# Patient Record
Sex: FEMALE | Race: WHITE | NOT HISPANIC OR LATINO | Employment: FULL TIME | URBAN - METROPOLITAN AREA
[De-identification: names, ages, dates, MRNs, and addresses within clinical notes are randomized per-mention and may not be internally consistent; named-entity substitution may affect disease eponyms.]

---

## 2022-08-15 ENCOUNTER — OFFICE VISIT (OUTPATIENT)
Dept: FAMILY MEDICINE CLINIC | Facility: CLINIC | Age: 32
End: 2022-08-15

## 2022-08-15 VITALS
DIASTOLIC BLOOD PRESSURE: 82 MMHG | TEMPERATURE: 99.6 F | HEART RATE: 107 BPM | WEIGHT: 286 LBS | HEIGHT: 69 IN | BODY MASS INDEX: 42.36 KG/M2 | OXYGEN SATURATION: 98 % | SYSTOLIC BLOOD PRESSURE: 138 MMHG

## 2022-08-15 DIAGNOSIS — E28.2 PCOS (POLYCYSTIC OVARIAN SYNDROME): ICD-10-CM

## 2022-08-15 DIAGNOSIS — E11.65 TYPE 2 DIABETES MELLITUS WITH HYPERGLYCEMIA, WITHOUT LONG-TERM CURRENT USE OF INSULIN: ICD-10-CM

## 2022-08-15 DIAGNOSIS — Z76.89 ESTABLISHING CARE WITH NEW DOCTOR, ENCOUNTER FOR: Primary | ICD-10-CM

## 2022-08-15 DIAGNOSIS — R51.9 ACUTE INTRACTABLE HEADACHE, UNSPECIFIED HEADACHE TYPE: ICD-10-CM

## 2022-08-15 DIAGNOSIS — R53.83 FATIGUE, UNSPECIFIED TYPE: ICD-10-CM

## 2022-08-15 DIAGNOSIS — L65.9 HAIR LOSS: ICD-10-CM

## 2022-08-15 DIAGNOSIS — E04.1 THYROID NODULE: ICD-10-CM

## 2022-08-15 DIAGNOSIS — R23.3 EASY BRUISING: ICD-10-CM

## 2022-08-15 PROCEDURE — 99204 OFFICE O/P NEW MOD 45 MIN: CPT | Performed by: NURSE PRACTITIONER

## 2022-08-15 RX ORDER — TIRZEPATIDE 10 MG/.5ML
INJECTION, SOLUTION SUBCUTANEOUS
COMMUNITY
End: 2022-08-16

## 2022-08-15 RX ORDER — METFORMIN HYDROCHLORIDE 500 MG/1
1000 TABLET, EXTENDED RELEASE ORAL 2 TIMES DAILY
Qty: 90 TABLET | Refills: 0 | Status: SHIPPED | OUTPATIENT
Start: 2022-08-15 | End: 2022-08-17 | Stop reason: SDUPTHER

## 2022-08-15 RX ORDER — SERTRALINE HYDROCHLORIDE 100 MG/1
100 TABLET, FILM COATED ORAL DAILY
COMMUNITY
End: 2022-08-15 | Stop reason: SDUPTHER

## 2022-08-15 RX ORDER — SERTRALINE HYDROCHLORIDE 100 MG/1
100 TABLET, FILM COATED ORAL DAILY
Qty: 90 TABLET | Refills: 1 | Status: SHIPPED | OUTPATIENT
Start: 2022-08-15 | End: 2022-08-17 | Stop reason: SDUPTHER

## 2022-08-15 RX ORDER — METFORMIN HYDROCHLORIDE 500 MG/1
1000 TABLET, EXTENDED RELEASE ORAL 2 TIMES DAILY
COMMUNITY
End: 2022-08-15 | Stop reason: SDUPTHER

## 2022-08-15 NOTE — PROGRESS NOTES
"Chief Complaint  Diabetes, Thyroid Problem, and Sore Throat    Subjective          Liz Harper presents to [facility]      History of Present Illness    Liz is a 32-year-old female patient who presents today to [facility] care.  She recently moved here from [place].  She was following PCP and endocrinology, which we have requested records for.    Patient has a past medical history of depression, diabetes type 2, menstrual problems.  Her last Pap was April 2022.  Femur surgery in 2009, wisdom extraction 2022, and appendectomy 2022.  She is , she does not smoke, she does not drink.  She works at a bank.  Parents are both living.  She has 1 brother.  Family history includes diabetes, lung disease, kidney disease, mental health issues.     She does present today with several complaints.     Back spasms - She has debilitating back spasm which cause her to sweat and makes her unable to move. Cramps occur in lower back and radiates through lower pelvis.  usually 5 or 6 days after menstrual cycle.    Swollen gland and neck with a sore throat.  She presents with imaging results of CT of neck and soft tissue with contrast.  Impression shows mild multinodular goiter largest nodule 7 mm in transverse diameter. 2.  Mild cervical adenopathy.  The only node measuring more than 1 cm short axis diameter is a right level 2 node.  We will order nuclear thyroid scan for further evaluation.    Patient reports that she been with a swollen face and jaw bilaterally. She went to a dentist who dx her with \"geographic tongue\". She was informed her issues were not dental and to speak with PCP. She tells me she has painful mouth, redness of facial skin that is hot to touch. She tells me the pain is dull, deep, and aching in the right side of face down into her ears and upper neck. It does go to the left side of her face. It has been present for last three months. Both sides never hurt " "at the same time.     Fatigue all the time - has been going on for the last three months of so.     Feels like she does not sleep - She goes to sleep at 9 pm and will awake at 7 am. She feels like she has only gotten one hour of sleep. She has been tested for sleep apnea and was negative according to patient. She has trouble getting up and going, doing chores, or even getting to work. She was tested for sleep apnea by PCP in last three months.     Headaches - She has been getting them a lot lately. She tells me she took ibuprofen which takes the edge off but doesn't get rid of the headache. Driving makes headaches worse. No N/V or photosensitivity. She does report she had migraines as a child however they have subsided years ago.     Easy bruising - Has noticed some bruises     Feet swelling - Reports this has been going on for last three or four weeks. She has no swelling today in office. It was only at the end of the day however, now her feet are swollen all day even with elevation. No numbness, tingly, cramps in her legs.     Hair loss - She has noticed large clumps at the bottom of the shower and then when she brushes, more hair falls out. She is \"always hot\"    She is having trouble conceiving - has tried for 11 years. She is wondering if her back pain is related to the PCOS.     Diabetes/PCOS - She is out of her freestyle 2. She was on Ozempic before and liked it better. She does not like the Mounjaro. It is not lowering her glucose levels. She tells me it has been running 240 with the med switch. When she was on Ozempic glucose was controlled at 120 range. We will obtain labs and get her switched back to Ozempic.     Objective     Vital Signs:   /82 (BP Location: Left arm, Patient Position: Sitting, Cuff Size: Large Adult)   Pulse 107   Temp 99.6 °F (37.6 °C) (Oral)   Ht 175.3 cm (69\")   Wt 130 kg (286 lb)   SpO2 98%   BMI 42.23 kg/m²       Physical Exam  Vitals reviewed.   Constitutional:       " Appearance: She is well-developed. She is obese.      Comments: Wearing a face mask     HENT:      Head: Normocephalic and atraumatic.      Right Ear: Tympanic membrane, ear canal and external ear normal. There is no impacted cerumen.      Left Ear: Tympanic membrane, ear canal and external ear normal. There is no impacted cerumen.      Nose: Nose normal.      Mouth/Throat:      Lips: Pink. No lesions.      Mouth: Mucous membranes are moist.      Tongue: No lesions. Tongue does not deviate from midline.      Palate: Lesions present. No mass.      Pharynx: Oropharynx is clear. Uvula midline. No oropharyngeal exudate or posterior oropharyngeal erythema.      Tonsils: No tonsillar exudate.   Eyes:      Conjunctiva/sclera: Conjunctivae normal.   Neck:      Vascular: No carotid bruit.   Cardiovascular:      Rate and Rhythm: Normal rate and regular rhythm.      Pulses: Normal pulses.      Heart sounds: Normal heart sounds. No murmur heard.  Pulmonary:      Effort: Pulmonary effort is normal. No respiratory distress.      Breath sounds: Normal breath sounds.   Abdominal:      General: Bowel sounds are normal. There is no distension.      Palpations: Abdomen is soft.      Tenderness: There is no abdominal tenderness.   Musculoskeletal:         General: Normal range of motion.      Cervical back: Normal range of motion and neck supple. No rigidity or tenderness.   Lymphadenopathy:      Cervical: No cervical adenopathy.   Skin:     General: Skin is warm and dry.      Findings: No rash.      Comments: Rash along cheeks bilaterally   Neurological:      Mental Status: She is alert and oriented to person, place, and time.   Psychiatric:         Behavior: Behavior normal.                  Result Review :                                   Assessment and Plan      Diagnoses and all orders for this visit:    1. Establishing care with new doctor, encounter for (Primary)    2. Fatigue, unspecified type  Assessment & Plan:  Patient's  fatigue and additional symptoms could be related to an underlying thyroid condition.  Could also be another underlying autoimmune condition.  Going to obtain a panel of labs CBC, CMP, anemia panel, thyroid panel, vitamin panel.    Orders:  -     Comprehensive metabolic panel  -     TSH  -     T3, free  -     T4  -     Vitamin D 25 Hydroxy  -     Magnesium  -     Vitamin B12  -     Iron and TIBC  -     Ferritin  -     Folate RBC  -     CBC & Differential    3. Hair loss  -     TSH  -     T3, free  -     T4    4. Thyroid nodule  -     TSH  -     T3, free  -     T4  -     CBC & Differential    5. Easy bruising  -     Iron and TIBC  -     Ferritin  -     Folate RBC    6. Acute intractable headache, unspecified headache type  -     Vitamin D 25 Hydroxy  -     Magnesium  -     Vitamin B12    7. PCOS (polycystic ovarian syndrome)  -     Hemoglobin A1c  -     Semaglutide,0.25 or 0.5MG/DOS, (Ozempic, 0.25 or 0.5 MG/DOSE,) 2 MG/1.5ML solution pen-injector; Inject 0.25 mg under the skin into the appropriate area as directed 1 (One) Time Per Week for 28 days, THEN 0.5 mg 1 (One) Time Per Week for 42 days.  Dispense: 2 pen; Refill: 0    8. Type 2 diabetes mellitus with hyperglycemia, without long-term current use of insulin (HCC)  -     Semaglutide,0.25 or 0.5MG/DOS, (Ozempic, 0.25 or 0.5 MG/DOSE,) 2 MG/1.5ML solution pen-injector; Inject 0.25 mg under the skin into the appropriate area as directed 1 (One) Time Per Week for 28 days, THEN 0.5 mg 1 (One) Time Per Week for 42 days.  Dispense: 2 pen; Refill: 0    Other orders  -     Discontinue: metFORMIN ER (GLUCOPHAGE-XR) 500 MG 24 hr tablet; Take 2 tablets by mouth 2 (Two) Times a Day.  Dispense: 90 tablet; Refill: 0  -     Discontinue: sertraline (ZOLOFT) 100 MG tablet; Take 1 tablet by mouth Daily.  Dispense: 90 tablet; Refill: 1          Follow Up       No follow-ups on file.      Patient was given instructions and counseling regarding her condition or for health maintenance  advice. Please see specific information pulled into the AVS if appropriate.     Deidre Mehta, APRN8/18/202213:01 EDT  This note has been electronically signed

## 2022-08-16 ENCOUNTER — TELEPHONE (OUTPATIENT)
Dept: FAMILY MEDICINE CLINIC | Facility: CLINIC | Age: 32
End: 2022-08-16

## 2022-08-16 DIAGNOSIS — E04.1 THYROID NODULE: Primary | ICD-10-CM

## 2022-08-16 PROBLEM — E28.2 PCOS (POLYCYSTIC OVARIAN SYNDROME): Status: ACTIVE | Noted: 2022-08-16

## 2022-08-16 LAB
25(OH)D3+25(OH)D2 SERPL-MCNC: 12.8 NG/ML (ref 30–100)
ALBUMIN SERPL-MCNC: 4.5 G/DL (ref 3.8–4.8)
ALBUMIN/GLOB SERPL: 1.7 {RATIO} (ref 1.2–2.2)
ALP SERPL-CCNC: 95 IU/L (ref 44–121)
ALT SERPL-CCNC: 22 IU/L (ref 0–32)
AST SERPL-CCNC: 23 IU/L (ref 0–40)
BASOPHILS # BLD AUTO: 0 X10E3/UL (ref 0–0.2)
BASOPHILS NFR BLD AUTO: 1 %
BILIRUB SERPL-MCNC: 0.3 MG/DL (ref 0–1.2)
BUN SERPL-MCNC: 10 MG/DL (ref 6–20)
BUN/CREAT SERPL: 20 (ref 9–23)
CALCIUM SERPL-MCNC: 9.5 MG/DL (ref 8.7–10.2)
CHLORIDE SERPL-SCNC: 100 MMOL/L (ref 96–106)
CO2 SERPL-SCNC: 21 MMOL/L (ref 20–29)
CREAT SERPL-MCNC: 0.5 MG/DL (ref 0.57–1)
EGFRCR-CYS SERPLBLD CKD-EPI 2021: 129 ML/MIN/1.73
EOSINOPHIL # BLD AUTO: 0.2 X10E3/UL (ref 0–0.4)
EOSINOPHIL NFR BLD AUTO: 2 %
ERYTHROCYTE [DISTWIDTH] IN BLOOD BY AUTOMATED COUNT: 14 % (ref 11.7–15.4)
FERRITIN SERPL-MCNC: 75 NG/ML (ref 15–150)
FOLATE BLD-MCNC: 605 NG/ML
FOLATE RBC-MCNC: 1795 NG/ML
GLOBULIN SER CALC-MCNC: 2.6 G/DL (ref 1.5–4.5)
GLUCOSE SERPL-MCNC: 292 MG/DL (ref 65–99)
HBA1C MFR BLD: 7.1 % (ref 4.8–5.6)
HCT VFR BLD AUTO: 33.7 % (ref 34–46.6)
HGB BLD-MCNC: 11.6 G/DL (ref 11.1–15.9)
IMM GRANULOCYTES # BLD AUTO: 0 X10E3/UL (ref 0–0.1)
IMM GRANULOCYTES NFR BLD AUTO: 1 %
IRON SATN MFR SERPL: 17 % (ref 15–55)
IRON SERPL-MCNC: 55 UG/DL (ref 27–159)
LYMPHOCYTES # BLD AUTO: 1.7 X10E3/UL (ref 0.7–3.1)
LYMPHOCYTES NFR BLD AUTO: 27 %
MAGNESIUM SERPL-MCNC: 1.7 MG/DL (ref 1.6–2.3)
MCH RBC QN AUTO: 29.7 PG (ref 26.6–33)
MCHC RBC AUTO-ENTMCNC: 34.4 G/DL (ref 31.5–35.7)
MCV RBC AUTO: 86 FL (ref 79–97)
MONOCYTES # BLD AUTO: 0.3 X10E3/UL (ref 0.1–0.9)
MONOCYTES NFR BLD AUTO: 5 %
NEUTROPHILS # BLD AUTO: 4.1 X10E3/UL (ref 1.4–7)
NEUTROPHILS NFR BLD AUTO: 64 %
PLATELET # BLD AUTO: 229 X10E3/UL (ref 150–450)
POTASSIUM SERPL-SCNC: 4.2 MMOL/L (ref 3.5–5.2)
PROT SERPL-MCNC: 7.1 G/DL (ref 6–8.5)
RBC # BLD AUTO: 3.91 X10E6/UL (ref 3.77–5.28)
SODIUM SERPL-SCNC: 139 MMOL/L (ref 134–144)
T3FREE SERPL-MCNC: 3 PG/ML (ref 2–4.4)
T4 SERPL-MCNC: 9.4 UG/DL (ref 4.5–12)
TIBC SERPL-MCNC: 328 UG/DL (ref 250–450)
TSH SERPL DL<=0.005 MIU/L-ACNC: 1 UIU/ML (ref 0.45–4.5)
UIBC SERPL-MCNC: 273 UG/DL (ref 131–425)
VIT B12 SERPL-MCNC: 394 PG/ML (ref 232–1245)
WBC # BLD AUTO: 6.4 X10E3/UL (ref 3.4–10.8)

## 2022-08-16 RX ORDER — SEMAGLUTIDE 1.34 MG/ML
INJECTION, SOLUTION SUBCUTANEOUS
Qty: 2 PEN | Refills: 0 | Status: SHIPPED | OUTPATIENT
Start: 2022-08-16 | End: 2022-08-18 | Stop reason: SDUPTHER

## 2022-08-16 NOTE — TELEPHONE ENCOUNTER
Patient was in need of insulin needles yesterday at her appointment.  I was not aware of the sizing of those.  Could you check with the note that she gave us yesterday or give her a call and place the order so I can send it to pharmacy?

## 2022-08-16 NOTE — PROGRESS NOTES
Patient's labs have returned.  She is vitamin D deficient with a level of 12.8.  We need to get this above 30.  I am going to send supplementation in for this.  She will take once weekly.  This can contribute to headaches but we are still going to evaluate to see if this is what is causing the headaches.    Patient's hemoglobin A1c is 7.1.  Patient has no iron deficiency anemia noted.      Her thyroid panel came back normal however, I would like to order that thyroid scan as I discussed with her yesterday.  We will wait to see what that returns as.

## 2022-08-17 ENCOUNTER — TELEPHONE (OUTPATIENT)
Dept: FAMILY MEDICINE CLINIC | Facility: CLINIC | Age: 32
End: 2022-08-17

## 2022-08-17 NOTE — TELEPHONE ENCOUNTER
Caller: Liz Harper    Relationship: Self    Best call back number: [phone]    What test was performed: LABS    When was the test performed: 8/15/22    Where was the test performed: OFFICE    Additional notes: PATIENT REQUESTING CALL BACK TO GO OVER LABS

## 2022-08-17 NOTE — TELEPHONE ENCOUNTER
Caller: Lzi Harper    Relationship: Self    Best call back number: [phone]    Requested Prescriptions:   Requested Prescriptions     Pending Prescriptions Disp Refills   • metFORMIN ER (GLUCOPHAGE-XR) 500 MG 24 hr tablet 90 tablet 0     Sig: Take 2 tablets by mouth 2 (Two) Times a Day.   • sertraline (ZOLOFT) 100 MG tablet 90 tablet 1     Sig: Take 1 tablet by mouth Daily.        Pharmacy where request should be sent:      Additional details provided by patient: PATIENT STATED THAT SHE HAS BEEN OUT OF HER FREESTYLE FOR A FEW WEEKS BUT HUB WAS UNABLE TO FIND ON MED LISTS SO     Does the patient have less than a 3 day supply:  [x] Yes  [] No    Inez CONNER Rep   08/17/22 08:48 EDT

## 2022-08-18 DIAGNOSIS — E11.65 TYPE 2 DIABETES MELLITUS WITH HYPERGLYCEMIA, WITHOUT LONG-TERM CURRENT USE OF INSULIN: ICD-10-CM

## 2022-08-18 DIAGNOSIS — E55.9 VITAMIN D DEFICIENCY: Primary | ICD-10-CM

## 2022-08-18 DIAGNOSIS — E28.2 PCOS (POLYCYSTIC OVARIAN SYNDROME): ICD-10-CM

## 2022-08-18 RX ORDER — SERTRALINE HYDROCHLORIDE 100 MG/1
100 TABLET, FILM COATED ORAL DAILY
Qty: 90 TABLET | Refills: 1 | Status: SHIPPED | OUTPATIENT
Start: 2022-08-18 | End: 2023-01-23 | Stop reason: SDUPTHER

## 2022-08-18 RX ORDER — BLOOD-GLUCOSE METER
1 KIT MISCELLANEOUS 3 TIMES DAILY
Qty: 1 EACH | Refills: 0 | Status: SHIPPED | OUTPATIENT
Start: 2022-08-18 | End: 2023-08-18

## 2022-08-18 RX ORDER — ERGOCALCIFEROL 1.25 MG/1
50000 CAPSULE ORAL WEEKLY
Qty: 5 CAPSULE | Refills: 10 | Status: SHIPPED | OUTPATIENT
Start: 2022-08-18 | End: 2023-01-19

## 2022-08-18 RX ORDER — METFORMIN HYDROCHLORIDE 500 MG/1
1000 TABLET, EXTENDED RELEASE ORAL 2 TIMES DAILY
Qty: 90 TABLET | Refills: 0 | Status: SHIPPED | OUTPATIENT
Start: 2022-08-18 | End: 2023-01-23 | Stop reason: SDUPTHER

## 2022-08-18 RX ORDER — SEMAGLUTIDE 1.34 MG/ML
INJECTION, SOLUTION SUBCUTANEOUS
Qty: 2 PEN | Refills: 0 | Status: SHIPPED | OUTPATIENT
Start: 2022-08-18 | End: 2022-10-27

## 2022-08-18 NOTE — ASSESSMENT & PLAN NOTE
Patient's fatigue and additional symptoms could be related to an underlying thyroid condition.  Could also be another underlying autoimmune condition.  Going to obtain a panel of labs CBC, CMP, anemia panel, thyroid panel, vitamin panel.

## 2022-08-18 NOTE — PROGRESS NOTES
Not sure what happened to the Ozempic.  I did resend that to [facility] as well as a vitamin D supplement to take once weekly.  I also sent in the FreeStyle glucometer.  Thank you

## 2022-08-19 ENCOUNTER — TELEPHONE (OUTPATIENT)
Dept: FAMILY MEDICINE CLINIC | Facility: CLINIC | Age: 32
End: 2022-08-19

## 2022-08-19 NOTE — TELEPHONE ENCOUNTER
I attempted a PA for Ozempic, it declined it and gave these alternatives:     METFORMIN HCL ER TABS 500MG   METFORMIN HCL ER TABS 750MG   BYETTA INJECT/PEN 5MCG .   BYETTA INJECT/PEN 10MCG .   TRULICITY SD PEN 0.5ML 4'S 0. 0.75MG   TRULICITY SD PEN 0.5ML 4'S 1. 1.5MG   BYDUREON BCISE AUTOINJECTR 0. 2MG   TRULICITY SD PEN 0.5ML 4'S 3MG

## 2022-08-22 NOTE — TELEPHONE ENCOUNTER
Patient is already on metformin.  She has taken Ozempic before in the past and had great benefit.  Her PCP switched her to a different injectable called Mounjaro which is keeping her glusoce elevated. Will this help for the PA?

## 2022-08-29 ENCOUNTER — HOSPITAL ENCOUNTER (OUTPATIENT)
Dept: NUCLEAR MEDICINE | Facility: HOSPITAL | Age: 32
Discharge: HOME | End: 2022-08-29

## 2022-08-29 DIAGNOSIS — E04.1 THYROID NODULE: ICD-10-CM

## 2022-08-29 PROCEDURE — 0 SODIUM IODIDE 3.7 MBQ CAPSULE: Performed by: NURSE PRACTITIONER

## 2022-08-29 PROCEDURE — 78014 THYROID IMAGING W/BLOOD FLOW: CPT

## 2022-08-29 PROCEDURE — A9516 IODINE I-123 SOD IODIDE MIC: HCPCS | Performed by: NURSE PRACTITIONER

## 2022-08-29 RX ORDER — SODIUM IODIDE I 123 100 UCI/1
1 CAPSULE, GELATIN COATED ORAL
Status: COMPLETED | OUTPATIENT
Start: 2022-08-29 | End: 2022-08-29

## 2022-08-29 RX ADMIN — SODIUM IODIDE I 123 1 CAPSULE: 100 CAPSULE, GELATIN COATED ORAL at 13:31

## 2022-08-30 ENCOUNTER — HOSPITAL ENCOUNTER (OUTPATIENT)
Dept: NUCLEAR MEDICINE | Facility: HOSPITAL | Age: 32
Discharge: HOME | End: 2022-08-30

## 2022-11-23 ENCOUNTER — OFFICE VISIT (OUTPATIENT)
Dept: FAMILY MEDICINE CLINIC | Facility: CLINIC | Age: 32
End: 2022-11-23

## 2022-11-23 VITALS
OXYGEN SATURATION: 100 % | BODY MASS INDEX: 41.69 KG/M2 | HEART RATE: 98 BPM | TEMPERATURE: 98.5 F | SYSTOLIC BLOOD PRESSURE: 118 MMHG | HEIGHT: 69 IN | DIASTOLIC BLOOD PRESSURE: 84 MMHG | WEIGHT: 281.5 LBS

## 2022-11-23 DIAGNOSIS — R05.1 ACUTE COUGH: Primary | ICD-10-CM

## 2022-11-23 DIAGNOSIS — J01.00 ACUTE NON-RECURRENT MAXILLARY SINUSITIS: ICD-10-CM

## 2022-11-23 LAB
EXPIRATION DATE: NORMAL
FLUAV AG UPPER RESP QL IA.RAPID: NOT DETECTED
FLUBV AG UPPER RESP QL IA.RAPID: NOT DETECTED
INTERNAL CONTROL: NORMAL
Lab: NORMAL
SARS-COV-2 AG UPPER RESP QL IA.RAPID: NOT DETECTED

## 2022-11-23 PROCEDURE — 99213 OFFICE O/P EST LOW 20 MIN: CPT | Performed by: NURSE PRACTITIONER

## 2022-11-23 PROCEDURE — 87428 SARSCOV & INF VIR A&B AG IA: CPT | Performed by: NURSE PRACTITIONER

## 2022-11-23 RX ORDER — GUAIFENESIN AND CODEINE PHOSPHATE 100; 10 MG/5ML; MG/5ML
5 SOLUTION ORAL 4 TIMES DAILY PRN
Qty: 240 ML | Refills: 1 | Status: SHIPPED | OUTPATIENT
Start: 2022-11-23 | End: 2023-01-19

## 2022-11-23 RX ORDER — AZITHROMYCIN 250 MG/1
TABLET, FILM COATED ORAL
Qty: 6 TABLET | Refills: 0 | Status: SHIPPED | OUTPATIENT
Start: 2022-11-23 | End: 2023-01-19

## 2022-11-23 RX ORDER — PREDNISONE 5 MG/1
TABLET ORAL
Qty: 20 TABLET | Refills: 0 | Status: SHIPPED | OUTPATIENT
Start: 2022-11-23 | End: 2022-12-01

## 2022-11-23 RX ORDER — CETIRIZINE HYDROCHLORIDE 10 MG/1
10 TABLET ORAL DAILY
Qty: 30 TABLET | Refills: 2 | Status: SHIPPED | OUTPATIENT
Start: 2022-11-23 | End: 2023-01-19

## 2022-11-23 RX ORDER — TRIAMCINOLONE ACETONIDE 55 UG/1
2 SPRAY, METERED NASAL DAILY
Qty: 16.5 G | Refills: 2 | Status: SHIPPED | OUTPATIENT
Start: 2022-11-23 | End: 2023-01-19

## 2022-11-23 NOTE — PATIENT INSTRUCTIONS
COVID/influenza test  Z-Jose  Prednisone 5 mg taper dose monitor blood sugars  Codeine cough syrup 1 to 2 teaspoons every 4 hours as needed cough  Zyrtec/Nasacort/Sudafed

## 2022-11-23 NOTE — PROGRESS NOTES
"    Liz Harper is a 32 y.o. female.     History of Present Illness  32-year-old white female who comes in today with 5-day history of persistent cough.  Nasal congestion, frontal headache, shortness of breath and chills.  She was negative for COVID and influenza today and she states she has been testing daily for COVID and they have all been negative.  Lungs are clear I am placing her on a Z-Jose steroids allergy medication and a codeine cough syrup.  She got a work note to return to work Friday for half day.    Vital signs are stable                COVID/influenza test  Z-Jose  Prednisone 5 mg taper dose monitor blood sugars  Codeine cough syrup 1 to 2 teaspoons every 4 hours as needed cough  Zyrtec/Nasacort/Sudafed       The following portions of the patient's history were reviewed and updated as appropriate: allergies, current medications, past family history, past medical history, past social history, past surgical history and problem list.    Vitals:    11/23/22 1552   BP: 118/84   BP Location: Right arm   Patient Position: Sitting   Cuff Size: Large Adult   Pulse: 98   Temp: 98.5 °F (36.9 °C)   TempSrc: Oral   SpO2: 100%   Weight: 128 kg (281 lb 8 oz)   Height: 175.3 cm (69.02\")     Body mass index is 41.55 kg/m².    Past Medical History:   Diagnosis Date   • Depression    • Diabetes mellitus (HCC)    • Menstrual problem      Past Surgical History:   Procedure Laterality Date   • APPENDECTOMY     • FEMUR FRACTURE SURGERY     • WISDOM TOOTH EXTRACTION       Family History   Problem Relation Age of Onset   • No Known Problems Mother    • No Known Problems Father    • No Known Problems Brother    • Kidney disease Paternal Aunt        There is no immunization history on file for this patient.    Office Visit on 08/15/2022   Component Date Value Ref Range Status   • Glucose 08/15/2022 292 (H)  65 - 99 mg/dL Final   • BUN 08/15/2022 10  6 - 20 mg/dL Final   • Creatinine 08/15/2022 0.50 (L)  0.57 - 1.00 mg/dL " Final   • EGFR Result 08/15/2022 129  >59 mL/min/1.73 Final   • BUN/Creatinine Ratio 08/15/2022 20  9 - 23 Final   • Sodium 08/15/2022 139  134 - 144 mmol/L Final   • Potassium 08/15/2022 4.2  3.5 - 5.2 mmol/L Final   • Chloride 08/15/2022 100  96 - 106 mmol/L Final   • Total CO2 08/15/2022 21  20 - 29 mmol/L Final   • Calcium 08/15/2022 9.5  8.7 - 10.2 mg/dL Final   • Total Protein 08/15/2022 7.1  6.0 - 8.5 g/dL Final   • Albumin 08/15/2022 4.5  3.8 - 4.8 g/dL Final   • Globulin 08/15/2022 2.6  1.5 - 4.5 g/dL Final   • A/G Ratio 08/15/2022 1.7  1.2 - 2.2 Final   • Total Bilirubin 08/15/2022 0.3  0.0 - 1.2 mg/dL Final   • Alkaline Phosphatase 08/15/2022 95  44 - 121 IU/L Final   • AST (SGOT) 08/15/2022 23  0 - 40 IU/L Final   • ALT (SGPT) 08/15/2022 22  0 - 32 IU/L Final   • TSH 08/15/2022 1.000  0.450 - 4.500 uIU/mL Final   • T3, Free 08/15/2022 3.0  2.0 - 4.4 pg/mL Final   • T4, Total 08/15/2022 9.4  4.5 - 12.0 ug/dL Final   • 25 Hydroxy, Vitamin D 08/15/2022 12.8 (L)  30.0 - 100.0 ng/mL Final    Comment: Vitamin D deficiency has been defined by the [place] [employer]  [employer] and an Endocrine Society practice guideline as a  level of serum 25-OH vitamin D less than 20 ng/mL (1,2).  The Endocrine Society went on to further define vitamin D  insufficiency as a level between 21 and 29 ng/mL (2).  1. IOM ([place] [employer]). 2010. Dietary reference     intakes for calcium and D. [place]: The     National Academies Press.  2. Julio C YOUSSEF, Aylin ROSALES, Serafin ROMERO, et al.     Evaluation, treatment, and prevention of vitamin D     deficiency: an Endocrine Society clinical practice     guideline. JCEM. 2011 Jul; 96(7):1911-30.     • Magnesium 08/15/2022 1.7  1.6 - 2.3 mg/dL Final   • Vitamin B-12 08/15/2022 394  232 - 1,245 pg/mL Final   • TIBC 08/15/2022 328  250 - 450 ug/dL Final   • UIBC 08/15/2022 273  131 - 425 ug/dL Final   • Iron 08/15/2022 55  27 - 159 ug/dL Final   • Iron Saturation 08/15/2022 17  15 -  55 % Final   • Ferritin 08/15/2022 75  15 - 150 ng/mL Final   • Folate, Hemolysate 08/15/2022 605.0  Not Estab. ng/mL Final   • RBC Folate 08/15/2022 1,795  >498 ng/mL Final   • WBC 08/15/2022 6.4  3.4 - 10.8 x10E3/uL Final   • RBC 08/15/2022 3.91  3.77 - 5.28 x10E6/uL Final   • Hemoglobin 08/15/2022 11.6  11.1 - 15.9 g/dL Final   • Hematocrit 08/15/2022 33.7 (L)  34.0 - 46.6 % Final   • MCV 08/15/2022 86  79 - 97 fL Final   • MCH 08/15/2022 29.7  26.6 - 33.0 pg Final   • MCHC 08/15/2022 34.4  31.5 - 35.7 g/dL Final   • RDW 08/15/2022 14.0  11.7 - 15.4 % Final   • Platelets 08/15/2022 229  150 - 450 x10E3/uL Final   • Neutrophil Rel % 08/15/2022 64  Not Estab. % Final   • Lymphocyte Rel % 08/15/2022 27  Not Estab. % Final   • Monocyte Rel % 08/15/2022 5  Not Estab. % Final   • Eosinophil Rel % 08/15/2022 2  Not Estab. % Final   • Basophil Rel % 08/15/2022 1  Not Estab. % Final   • Neutrophils Absolute 08/15/2022 4.1  1.4 - 7.0 x10E3/uL Final   • Lymphocytes Absolute 08/15/2022 1.7  0.7 - 3.1 x10E3/uL Final   • Monocytes Absolute 08/15/2022 0.3  0.1 - 0.9 x10E3/uL Final   • Eosinophils Absolute 08/15/2022 0.2  0.0 - 0.4 x10E3/uL Final   • Basophils Absolute 08/15/2022 0.0  0.0 - 0.2 x10E3/uL Final   • Immature Granulocyte Rel % 08/15/2022 1  Not Estab. % Final   • Immature Grans Absolute 08/15/2022 0.0  0.0 - 0.1 x10E3/uL Final   • Hemoglobin A1C 08/15/2022 7.1 (H)  4.8 - 5.6 % Final    Comment:          Prediabetes: 5.7 - 6.4           Diabetes: >6.4           Glycemic control for adults with diabetes: <7.0           Review of Systems   Constitutional: Positive for fatigue.   HENT: Positive for congestion and rhinorrhea.    Respiratory: Positive for cough.    Cardiovascular: Negative.    Gastrointestinal: Negative.    Genitourinary: Negative.    Musculoskeletal: Negative.    Skin: Negative.    Neurological: Negative.    Psychiatric/Behavioral: Negative.        Objective   Physical Exam  Constitutional:        Appearance: Normal appearance.   HENT:      Head: Normocephalic.      Mouth/Throat:      Comments: Postnasal drip  Cardiovascular:      Rate and Rhythm: Normal rate and regular rhythm.      Pulses: Normal pulses.      Heart sounds: Normal heart sounds.   Pulmonary:      Effort: Pulmonary effort is normal.      Breath sounds: Normal breath sounds.   Abdominal:      General: Bowel sounds are normal.   Musculoskeletal:         General: Normal range of motion.   Skin:     General: Skin is warm and dry.   Neurological:      General: No focal deficit present.      Mental Status: She is alert and oriented to person, place, and time.   Psychiatric:         Mood and Affect: Mood normal.         Behavior: Behavior normal.         Procedures    Assessment & Plan   Diagnoses and all orders for this visit:    1. Acute cough (Primary)  -     POCT SARS-CoV-2 Antigen DAYANARA  -     guaiFENesin-codeine (GUAIFENESIN AC) 100-10 MG/5ML liquid; Take 5 mL by mouth 4 (Four) Times a Day As Needed for Cough. 1-2 tsp every 4-6 hours as needed for cough. Monitor for drowsiness  Dispense: 240 mL; Refill: 1    2. Acute non-recurrent maxillary sinusitis    Other orders  -     cetirizine (ZyrTEC Allergy) 10 MG tablet; Take 1 tablet by mouth Daily.  Dispense: 30 tablet; Refill: 2  -     Triamcinolone Acetonide (Nasacort Allergy 24HR) 55 MCG/ACT nasal inhaler; 2 sprays into the nostril(s) as directed by provider Daily.  Dispense: 16.5 g; Refill: 2  -     azithromycin (Zithromax Z-Jose) 250 MG tablet; Take 2 tablets the first day, then 1 tablet daily for 4 days.  Dispense: 6 tablet; Refill: 0  -     predniSONE 5 MG (48) tablet therapy pack dose pack; Take 4 tablets by mouth Daily for 2 days, THEN 3 tablets Daily for 2 days, THEN 2 tablets Daily for 2 days, THEN 1 tablet Daily for 2 days.  Dispense: 20 tablet; Refill: 0          Current Outpatient Medications:   •  Continuous Blood Gluc Sensor (FreeStyle Jim 2 Sensor) misc, USE AS DIRECTED., Disp: 1  each, Rfl: 0  •  glucose monitoring kit (FREESTYLE) monitoring kit, 1 each 3 (Three) Times a Day., Disp: 1 each, Rfl: 0  •  metFORMIN ER (GLUCOPHAGE-XR) 500 MG 24 hr tablet, Take 2 tablets by mouth 2 (Two) Times a Day., Disp: 90 tablet, Rfl: 0  •  Semaglutide, 1 MG/DOSE, (OZEMPIC) 2 MG/1.5ML solution pen-injector, Inject  under the skin into the appropriate area as directed 1 (One) Time Per Week. weekly, Disp: , Rfl:   •  sertraline (ZOLOFT) 100 MG tablet, Take 1 tablet by mouth Daily., Disp: 90 tablet, Rfl: 1  •  vitamin D (ERGOCALCIFEROL) 1.25 MG (90459 UT) capsule capsule, Take 1 capsule by mouth 1 (One) Time Per Week., Disp: 5 capsule, Rfl: 10  •  azithromycin (Zithromax Z-Jose) 250 MG tablet, Take 2 tablets the first day, then 1 tablet daily for 4 days., Disp: 6 tablet, Rfl: 0  •  cetirizine (ZyrTEC Allergy) 10 MG tablet, Take 1 tablet by mouth Daily., Disp: 30 tablet, Rfl: 2  •  guaiFENesin-codeine (GUAIFENESIN AC) 100-10 MG/5ML liquid, Take 5 mL by mouth 4 (Four) Times a Day As Needed for Cough. 1-2 tsp every 4-6 hours as needed for cough. Monitor for drowsiness, Disp: 240 mL, Rfl: 1  •  predniSONE 5 MG (48) tablet therapy pack dose pack, Take 4 tablets by mouth Daily for 2 days, THEN 3 tablets Daily for 2 days, THEN 2 tablets Daily for 2 days, THEN 1 tablet Daily for 2 days., Disp: 20 tablet, Rfl: 0  •  Triamcinolone Acetonide (Nasacort Allergy 24HR) 55 MCG/ACT nasal inhaler, 2 sprays into the nostril(s) as directed by provider Daily., Disp: 16.5 g, Rfl: 2           Lissa Reynaga, APRN 11/23/2022 16:09 EST  This note has been electronically signed

## 2023-01-19 ENCOUNTER — OFFICE VISIT (OUTPATIENT)
Dept: FAMILY MEDICINE CLINIC | Facility: CLINIC | Age: 33
End: 2023-01-19
Payer: COMMERCIAL

## 2023-01-19 DIAGNOSIS — E11.65 TYPE 2 DIABETES MELLITUS WITH HYPERGLYCEMIA, WITHOUT LONG-TERM CURRENT USE OF INSULIN: Primary | ICD-10-CM

## 2023-01-19 DIAGNOSIS — U07.1 POSITIVE SELF-ADMINISTERED ANTIGEN TEST FOR COVID-19: ICD-10-CM

## 2023-01-19 PROCEDURE — 99442 PR PHYS/QHP TELEPHONE EVALUATION 11-20 MIN: CPT | Performed by: NURSE PRACTITIONER

## 2023-01-19 NOTE — PROGRESS NOTES
Chief Complaint  Diabetes and Illness    Subjective          Liz Harper presents to [facility] MEDICINE     You have chosen to receive care through a telephone visit. Do you consent to use a telephone visit for your medical care today? Yes    0900 - Call Start   0915 - Call End   15 minutes- Total Call time      History of Present Illness    Liz is a 32 year old female patient who presents today via telephone encounter to discuss diabetes and positive home COVID test.  We are doing a telephone encounter as she is positive for COVID.    Tested positive yesterday for Covid with home test. Symptoms began Tuesday around 2 a.m. She is with sore throat, runny nose, cough dry, chest congestion, and intermittent nausea. She is without fevers, SOB, CP. She has not taken anything OTC.  She is requesting treatment with Paxlovid antiviral therapy.  Discussed potential side effects and unwanted symptoms.  Patient would like to proceed with medication.    Pt is diabetic. She reports her glucose levels are in the 300's fasting.  She indicates that she cannot get them under control. She is taking metformin and Ozempic. She is willing to come in next week for labs. Last A1C was August 2022 at 7.1. She is with bilateral neuropathy pain in toes and feet, which is new.  She tells me it is painful and tingly.  Only stays in the feet does not radiate above.  She denies polyuria, polydipsia, polyphagia.      Objective     Vital Signs:   There were no vitals taken for this visit.          Physical Exam     No physical exam performed today as this was a telephone encounter.  Patient was able to communicate with ease and without pause.  She denied being in any distress.      Result Review :                                   Assessment and Plan      Diagnoses and all orders for this visit:    1. Type 2 diabetes mellitus with hyperglycemia, without long-term current use of insulin (HCC)  (Primary)  Assessment & Plan:  Diabetes is worsening.   Continue current treatment regimen.  Reminded to bring in blood sugar diary at next visit.  Dietary recommendations for ADA diet.  Discussed sick day management.  Discussed foot care.  Will assess labs next week and follow back up with patient.  Sounds like she will need some medication adjustments which we will take care of.  Diabetes will be reassessed After labs have been evaluated..    Orders:  -     CBC (No Diff); Future  -     Comprehensive metabolic panel; Future  -     Hemoglobin A1c; Future  -     Lipid Panel With LDL / HDL Ratio; Future    2. Positive self-administered antigen test for COVID-19  Assessment & Plan:  Patient with positive home COVID test.  She is symptomatic and underlying condition of diabetes and obesity.    Paxlovid REQUESTED BY PATIENT for treatment of COVID infection.  I did speak with patient about this not being an FDA approved medication.  Informed patient that this medication is for mild to moderate COVID symptoms in patients who have tested positive for COVID and at risk for severe progression of COVID-19. We discussed potential side effects and symptoms of medication including, but not limited to, Hives, trouble swallowing or breathing, swelling of mouth lips or face, throat tightness, hoarseness, skin rash, liver issues altered taste, diarrhea, hypertension, muscle aches.      Orders:  -     Nirmatrelvir&Ritonavir 300/100 (PAXLOVID) 20 x 150 MG & 10 x 100MG tablet therapy pack tablet; Take 3 tablets by mouth 2 (Two) Times a Day for 5 days.  Dispense: 30 tablet; Refill: 0          Follow Up       No follow-ups on file.      Patient was given instructions and counseling regarding her condition or for health maintenance advice. Please see specific information pulled into the AVS if appropriate.     Deidre Mehta, APRN1/19/202309:19 EST  This note has been electronically signed

## 2023-01-19 NOTE — ASSESSMENT & PLAN NOTE
Diabetes is worsening.   Continue current treatment regimen.  Reminded to bring in blood sugar diary at next visit.  Dietary recommendations for ADA diet.  Discussed sick day management.  Discussed foot care.  Will assess labs next week and follow back up with patient.  Sounds like she will need some medication adjustments which we will take care of.  Diabetes will be reassessed After labs have been evaluated..

## 2023-01-19 NOTE — ASSESSMENT & PLAN NOTE
Patient with positive home COVID test.  She is symptomatic and underlying condition of diabetes and obesity.    Paxlovid REQUESTED BY PATIENT for treatment of COVID infection.  I did speak with patient about this not being an FDA approved medication.  Informed patient that this medication is for mild to moderate COVID symptoms in patients who have tested positive for COVID and at risk for severe progression of COVID-19. We discussed potential side effects and symptoms of medication including, but not limited to, Hives, trouble swallowing or breathing, swelling of mouth lips or face, throat tightness, hoarseness, skin rash, liver issues altered taste, diarrhea, hypertension, muscle aches.

## 2023-01-23 RX ORDER — SERTRALINE HYDROCHLORIDE 100 MG/1
100 TABLET, FILM COATED ORAL DAILY
Qty: 90 TABLET | Refills: 1 | Status: SHIPPED | OUTPATIENT
Start: 2023-01-23

## 2023-01-23 RX ORDER — METFORMIN HYDROCHLORIDE 500 MG/1
1000 TABLET, EXTENDED RELEASE ORAL 2 TIMES DAILY
Qty: 90 TABLET | Refills: 0 | Status: SHIPPED | OUTPATIENT
Start: 2023-01-23 | End: 2023-03-27 | Stop reason: SDUPTHER

## 2023-01-24 DIAGNOSIS — R73.09 ELEVATED HEMOGLOBIN A1C: ICD-10-CM

## 2023-01-24 DIAGNOSIS — E11.65 TYPE 2 DIABETES MELLITUS WITH HYPERGLYCEMIA, WITHOUT LONG-TERM CURRENT USE OF INSULIN: Primary | ICD-10-CM

## 2023-01-24 DIAGNOSIS — E78.2 ELEVATED TRIGLYCERIDES WITH HIGH CHOLESTEROL: Primary | ICD-10-CM

## 2023-01-24 RX ORDER — CHLORAL HYDRATE 500 MG
1000 CAPSULE ORAL
Qty: 90 EACH | Refills: 3 | Status: SHIPPED | OUTPATIENT
Start: 2023-01-24

## 2023-01-24 RX ORDER — SEMAGLUTIDE 1.34 MG/ML
INJECTION, SOLUTION SUBCUTANEOUS
Qty: 3 ML | Refills: 0 | Status: SHIPPED | OUTPATIENT
Start: 2023-01-24 | End: 2023-04-04

## 2023-03-27 RX ORDER — METFORMIN HYDROCHLORIDE 500 MG/1
1000 TABLET, EXTENDED RELEASE ORAL 2 TIMES DAILY
Qty: 90 TABLET | Refills: 0 | Status: SHIPPED | OUTPATIENT
Start: 2023-03-27

## 2023-03-27 NOTE — TELEPHONE ENCOUNTER
Caller: Liz Harper    Relationship: Self    Best call back number: [phone]    Requested Prescriptions:   Requested Prescriptions     Pending Prescriptions Disp Refills   • metFORMIN ER (GLUCOPHAGE-XR) 500 MG 24 hr tablet 90 tablet 0     Sig: Take 2 tablets by mouth 2 (Two) Times a Day.        Pharmacy where request should be sent: [facility] [address] [place], [place] [phone] [place], [place] [phone] FX     Last office visit with prescribing clinician: 1/19/2023   Last telemedicine visit with prescribing clinician: Visit date not found   Next office visit with prescribing clinician: Visit date not found     Additional details provided by patient: TOOK LAST DOSE TODAY     Does the patient have less than a 3 day supply:  [x] Yes  [] No    Would you like a call back once the refill request has been completed: [] Yes [] No    If the office needs to give you a call back, can they leave a voicemail: [] Yes [] No    Inez Anand Rep   03/27/23 09:04 EDT

## 2023-04-21 ENCOUNTER — OFFICE VISIT (OUTPATIENT)
Dept: FAMILY MEDICINE CLINIC | Facility: CLINIC | Age: 33
End: 2023-04-21
Payer: COMMERCIAL

## 2023-04-21 VITALS
TEMPERATURE: 97.2 F | BODY MASS INDEX: 40.52 KG/M2 | OXYGEN SATURATION: 97 % | SYSTOLIC BLOOD PRESSURE: 156 MMHG | WEIGHT: 273.6 LBS | HEART RATE: 104 BPM | HEIGHT: 69 IN | DIASTOLIC BLOOD PRESSURE: 96 MMHG

## 2023-04-21 DIAGNOSIS — E11.65 TYPE 2 DIABETES MELLITUS WITH HYPERGLYCEMIA, WITHOUT LONG-TERM CURRENT USE OF INSULIN: Primary | ICD-10-CM

## 2023-04-21 DIAGNOSIS — G62.9 NEUROPATHY: ICD-10-CM

## 2023-04-21 PROCEDURE — 99214 OFFICE O/P EST MOD 30 MIN: CPT | Performed by: NURSE PRACTITIONER

## 2023-04-21 RX ORDER — GABAPENTIN 100 MG/1
100 CAPSULE ORAL NIGHTLY
Qty: 30 CAPSULE | Refills: 0 | Status: SHIPPED | OUTPATIENT
Start: 2023-04-21

## 2023-04-21 NOTE — PROGRESS NOTES
"Chief Complaint  Diabetes    Subjective          Liz Harper presents to [facility] MEDIC[facility]    History of Present Illness    Liz is a 33-year-old female patient who presents today to follow-up regarding diabetes.    Patient was seen in January and advised to follow-up 6 to 8 weeks later as her diabetes was worsening.  She did not come in for labs prior to this visit.  Last A1c was 8.8 in January.  Patient is currently on metformin 1000 mg twice daily.     She was previously on Ozempic 1 mg weekly when she lived in [place]. She is tolerating the 0.5 mg. She is now out and asking for a refill. She usually injects on Tuesday and did not have a dose for this week.     She tells me since January her glucose levels have been terrible. She is with bilateral foot pain and tingling. Did not have this issue prior to January per patient. Pain in feet occurs at end of day when she is sitting down and relaxing. Sharp shooting, throbbing foot pain with numbness. When she goes to bed it keeps her awake. The pain is resolved by morning.     She works out twice weekly at [employer]. Her diet consists of vegetables, low carb, whole grain, high protein. Has lost 8 lbs since November.       Objective     Vital Signs:   /96 (BP Location: Left arm, Patient Position: Sitting, Cuff Size: Adult)   Pulse 104   Temp 97.2 °F (36.2 °C) (Oral)   Ht 175.3 cm (69.02\")   Wt 124 kg (273 lb 9.6 oz)   SpO2 97%   BMI 40.38 kg/m²           Physical Exam  Vitals reviewed.   Constitutional:       Appearance: She is well-developed.      Comments: Wearing a face mask     HENT:      Head: Normocephalic and atraumatic.      Nose: Nose normal.      Mouth/Throat:      Mouth: Mucous membranes are moist.      Pharynx: Oropharynx is clear.   Eyes:      Conjunctiva/sclera: Conjunctivae normal.      Pupils: Pupils are equal, round, and reactive to light.   Cardiovascular:      Rate and Rhythm: Normal rate and " regular rhythm.      Pulses: Normal pulses.      Heart sounds: Normal heart sounds.   Pulmonary:      Effort: Pulmonary effort is normal. No respiratory distress.      Breath sounds: Normal breath sounds.   Musculoskeletal:         General: Normal range of motion.      Cervical back: Normal range of motion.   Skin:     General: Skin is warm and dry.      Findings: No rash.   Neurological:      Mental Status: She is alert and oriented to person, place, and time.   Psychiatric:         Behavior: Behavior normal.                Result Review :                                   Assessment and Plan      Diagnoses and all orders for this visit:    1. Type 2 diabetes mellitus with hyperglycemia, without long-term current use of insulin (Primary)  -     CBC (No Diff)  -     Hemoglobin A1c  -     Semaglutide, 1 MG/DOSE, (OZEMPIC) 4 MG/3ML solution pen-injector; Inject 1 mg under the skin into the appropriate area as directed 1 (One) Time Per Week.  Dispense: 3 mL; Refill: 2  -     Continuous Blood Gluc Sensor (FreeStyle Jim 2 Sensor) misc; Apply 1 each topically to the appropriate area as directed See Admin Instructions.  Dispense: 2 each; Refill: 3  -     Ambulatory Referral to Endocrinology    2. Neuropathy  -     gabapentin (NEURONTIN) 100 MG capsule; Take 1 capsule by mouth Every Night.  Dispense: 30 capsule; Refill: 0  -     Ambulatory Referral to Endocrinology      Patient reports her glucose levels have been elevated since January.  She is working out and making dietary changes but no improvement.  She was previously on Ozempic 1 mg weekly prior to establish to establishing here at our office.  Patient also on Cycloset, metformin.  We will continue that regimen but bump her Ozempic up to 1 mg weekly dose.  Advised her to go ahead and inject this weekend.  Reeducated on side effects and symptoms that she experiences.  We will check CBC and A1c.  Going to place an endocrinology referral as well.  For patient's onset  of neuropathy going to give her evening gabapentin 100 mg.  We will monitor and follow-up with her in 4 weeks.            Follow Up       Return in about 4 weeks (around 5/19/2023) for with JOSE DE JESUS Lenz.      Patient was given instructions and counseling regarding her condition or for health maintenance advice. Please see specific information pulled into the AVS if appropriate.     Deidre Mehta, APRN4/21/202308:48 EDT  This note has been electronically signed

## 2023-04-22 LAB
ERYTHROCYTE [DISTWIDTH] IN BLOOD BY AUTOMATED COUNT: 13.9 % (ref 11.7–15.4)
HBA1C MFR BLD: 9 % (ref 4.8–5.6)
HCT VFR BLD AUTO: 40.2 % (ref 34–46.6)
HGB BLD-MCNC: 13.3 G/DL (ref 11.1–15.9)
MCH RBC QN AUTO: 28.4 PG (ref 26.6–33)
MCHC RBC AUTO-ENTMCNC: 33.1 G/DL (ref 31.5–35.7)
MCV RBC AUTO: 86 FL (ref 79–97)
PLATELET # BLD AUTO: 238 X10E3/UL (ref 150–450)
RBC # BLD AUTO: 4.69 X10E6/UL (ref 3.77–5.28)
WBC # BLD AUTO: 7.5 X10E3/UL (ref 3.4–10.8)

## 2023-04-24 ENCOUNTER — TELEPHONE (OUTPATIENT)
Dept: FAMILY MEDICINE CLINIC | Facility: CLINIC | Age: 33
End: 2023-04-24
Payer: COMMERCIAL

## 2023-04-24 NOTE — PROGRESS NOTES
Please notify Liz that I received her labs back.  Blood counts are normal.  Her A1c has slightly worsened from where it was 3 months ago.  It was 8.8 back in January is 9.0 now.  This should improve as we increased the Ozempic dosage.  I will see her in May.

## 2023-04-24 NOTE — TELEPHONE ENCOUNTER
I spoke with patient. She said the $900 is her copay AFTER insurance. I advised her to go on the ozempic website and download a form to fill out to get small copay and give to pharmacy.If this does not work, then she will need to go on a different dm medication due to cost.

## 2023-04-24 NOTE — TELEPHONE ENCOUNTER
I just looked up this medication PA and it says it is covered without authorization. I will call patient after lunch and discuss with her.

## 2023-04-24 NOTE — TELEPHONE ENCOUNTER
Was the patient getting this covered before?  If so did her insurance changed?  If the insurance changed, she needs to contact them and see what injectables are covered for her diabetes.

## 2023-04-24 NOTE — TELEPHONE ENCOUNTER
Caller: Liz Harper    Relationship: Self    Best call back number: [phone] (Mobile)    What medications are you currently taking:   Current Outpatient Medications on File Prior to Visit   Medication Sig Dispense Refill   • Bromocriptine Mesylate (CYCLOSET PO) Take 1 mg by mouth 2 (Two) Times a Day.     • Continuous Blood Gluc Sensor (FreeStyle Jim 2 Sensor) misc Apply 1 each topically to the appropriate area as directed See Admin Instructions. 2 each 3   • gabapentin (NEURONTIN) 100 MG capsule Take 1 capsule by mouth Every Night. 30 capsule 0   • glucose monitoring kit (FREESTYLE) monitoring kit 1 each 3 (Three) Times a Day. 1 each 0   • metFORMIN ER (GLUCOPHAGE-XR) 500 MG 24 hr tablet Take 2 tablets by mouth 2 (Two) Times a Day. 90 tablet 0   • Omega-3 Fatty Acids (fish oil) 1000 MG capsule capsule Take 1 capsule by mouth Daily With Breakfast. 90 each 3   • Semaglutide, 1 MG/DOSE, (OZEMPIC) 4 MG/3ML solution pen-injector Inject 1 mg under the skin into the appropriate area as directed 1 (One) Time Per Week. 3 mL 2   • sertraline (ZOLOFT) 100 MG tablet Take 1 tablet by mouth Daily. 90 tablet 1     No current facility-administered medications on file prior to visit.          When did you start taking these medications:      Which medication are you concerned about: Semaglutide, 1 MG/DOSE, (OZEMPIC) 4 MG/3ML solution pen-injector    Who prescribed you this medication: YADIEL THOMAS    What are your concerns: IT IS GOING TO COST PATIENT $900 PER MONTH    How long have you had these concerns:          THANKS

## 2023-04-24 NOTE — TELEPHONE ENCOUNTER
HUB TO READ  I left a  for patient to return call  Also, please tell her that I looked at the Prior Auth for her Ozempic and it tells me that its approved and she does not need one, so did something change, insurance or something? Will they not fill it at pharmacy?    And following msg is from Deidre:    Please notify Liz that I received her labs back.  Blood counts are normal.  Her A1c has slightly worsened from where it was 3 months ago.  It was 8.8 back in January is 9.0 now.  This should improve as we increased the Ozempic dosage.  I will see her in May.

## 2023-04-26 ENCOUNTER — TELEPHONE (OUTPATIENT)
Dept: FAMILY MEDICINE CLINIC | Facility: CLINIC | Age: 33
End: 2023-04-26
Payer: COMMERCIAL

## 2023-04-26 NOTE — TELEPHONE ENCOUNTER
Caller: Liz Harper    Relationship: Self    Best call back number:[phone]    What medication are you requesting: SUBSTITUTE FOR OZEMPIC. NOT TRULICITY     What are your current symptoms:     How long have you been experiencing symptoms:   Have you had these symptoms before:    [x] Yes  [] No    Have you been treated for these symptoms before:   [x] Yes  [] No    If a prescription is needed, what is your preferred pharmacy and phone number: [address] [phone] [place] [phone]      Additional notes: PATIENT CALLED BACK  AND THE MEDICATION OZEMPIC, WILL STILL BE  WAY TO EXPENSIVE EVEN WITH THE CARD AND THE INSURANCE.

## 2023-04-27 DIAGNOSIS — E11.65 TYPE 2 DIABETES MELLITUS WITH HYPERGLYCEMIA, WITHOUT LONG-TERM CURRENT USE OF INSULIN: Primary | ICD-10-CM

## 2023-04-27 RX ORDER — TIRZEPATIDE 2.5 MG/.5ML
2.5 INJECTION, SOLUTION SUBCUTANEOUS WEEKLY
Qty: 2 ML | Refills: 0 | Status: SHIPPED | OUTPATIENT
Start: 2023-04-27 | End: 2023-05-27

## 2023-04-27 NOTE — TELEPHONE ENCOUNTER
Please notify patient that I have a prescription savings card for a different injectable called Mounjaro.  This savings card will be upfront for her to .  I will place the order and she will give this to the pharmacy.  This is good for 1 year.  She will inject weekly just as she has been doing with with Ozempic.  This medication comes with similar side effect profile however they are very rare to occur.  She may experience some nausea.  If she develops any severe abdominal pain advised to go to ER and no longer injects.

## 2023-05-02 NOTE — TELEPHONE ENCOUNTER
I SPOKE WITH PATIENT, SHE VOICED  UNDERSTANDING. SHE PICKED THIS UP AT THE PHARMACY AND WILL FILL IT.

## 2023-05-04 ENCOUNTER — OFFICE VISIT (OUTPATIENT)
Dept: FAMILY MEDICINE CLINIC | Facility: CLINIC | Age: 33
End: 2023-05-04
Payer: COMMERCIAL

## 2023-05-04 VITALS
SYSTOLIC BLOOD PRESSURE: 151 MMHG | HEART RATE: 95 BPM | OXYGEN SATURATION: 97 % | HEIGHT: 69 IN | WEIGHT: 274 LBS | TEMPERATURE: 95.2 F | BODY MASS INDEX: 40.58 KG/M2 | DIASTOLIC BLOOD PRESSURE: 94 MMHG

## 2023-05-04 DIAGNOSIS — N30.01 ACUTE CYSTITIS WITH HEMATURIA: Primary | ICD-10-CM

## 2023-05-04 DIAGNOSIS — R10.2 PELVIC PAIN: ICD-10-CM

## 2023-05-04 DIAGNOSIS — B37.31 VAGINAL YEAST INFECTION: ICD-10-CM

## 2023-05-04 PROBLEM — R10.9 ABDOMINAL PAIN: Status: ACTIVE | Noted: 2023-05-04

## 2023-05-04 LAB
BILIRUB BLD-MCNC: NEGATIVE MG/DL
CLARITY, POC: CLEAR
COLOR UR: ABNORMAL
EXPIRATION DATE: ABNORMAL
GLUCOSE UR STRIP-MCNC: ABNORMAL MG/DL
KETONES UR QL: NEGATIVE
LEUKOCYTE EST, POC: ABNORMAL
Lab: ABNORMAL
NITRITE UR-MCNC: POSITIVE MG/ML
PH UR: 5 [PH] (ref 5–8)
PROT UR STRIP-MCNC: NEGATIVE MG/DL
RBC # UR STRIP: ABNORMAL /UL
SP GR UR: 1.02 (ref 1–1.03)
UROBILINOGEN UR QL: ABNORMAL

## 2023-05-04 PROCEDURE — 81003 URINALYSIS AUTO W/O SCOPE: CPT | Performed by: NURSE PRACTITIONER

## 2023-05-04 PROCEDURE — 99213 OFFICE O/P EST LOW 20 MIN: CPT | Performed by: NURSE PRACTITIONER

## 2023-05-04 RX ORDER — FLUCONAZOLE 200 MG/1
TABLET ORAL
COMMUNITY
Start: 2023-05-04

## 2023-05-04 RX ORDER — CIPROFLOXACIN 500 MG/1
500 TABLET, FILM COATED ORAL 2 TIMES DAILY
Qty: 10 TABLET | Refills: 0 | Status: SHIPPED | OUTPATIENT
Start: 2023-05-04

## 2023-05-04 NOTE — PROGRESS NOTES
"Chief Complaint  Abdominal Pain    Subjective          Liz Harper presents to [facility]IC[facility]      History of Present Illness    Liz is a 33-year-old female patient who presents today with complaints of pelvic pain.    Patient reports a week ago she developed symptoms of dysuria, itching, and irritation.  She did see a provider who sent in Diflucan for her earlier today.  Patient has not picked up.  She wanted further evaluation due to the pain and dysuria.  UA reveals positive nitrates, small leukocytes, trace blood.  Glucose urea greater than thousand.  Patient is diabetic.        Objective     Vital Signs:   /94 (BP Location: Left arm, Patient Position: Sitting, Cuff Size: Adult)   Pulse 95   Temp 95.2 °F (35.1 °C) (Infrared)   Ht 175.3 cm (69.02\")   Wt 124 kg (274 lb)   SpO2 97%   BMI 40.44 kg/m²           Physical Exam  Vitals reviewed.   Constitutional:       Appearance: She is well-developed.      Comments: Wearing a face mask     HENT:      Head: Normocephalic and atraumatic.      Mouth/Throat:      Mouth: Mucous membranes are moist.      Pharynx: Oropharynx is clear.   Eyes:      Conjunctiva/sclera: Conjunctivae normal.      Pupils: Pupils are equal, round, and reactive to light.   Cardiovascular:      Rate and Rhythm: Normal rate and regular rhythm.      Pulses: Normal pulses.      Heart sounds: Normal heart sounds.   Pulmonary:      Effort: Pulmonary effort is normal. No respiratory distress.      Breath sounds: Normal breath sounds.   Abdominal:      Palpations: Abdomen is soft.      Tenderness: There is abdominal tenderness in the suprapubic area.       Musculoskeletal:         General: Normal range of motion.      Cervical back: Normal range of motion.   Skin:     General: Skin is warm and dry.      Findings: No rash.   Neurological:      Mental Status: She is alert and oriented to person, place, and time.   Psychiatric:         Behavior: " Behavior normal.                Result Review :                                   Assessment and Plan      Diagnoses and all orders for this visit:    1. Acute cystitis with hematuria (Primary)    2. Pelvic pain  -     POC Urinalysis Dipstick, Automated    3. Vaginal yeast infection    Other orders  -     ciprofloxacin (Cipro) 500 MG tablet; Take 1 tablet by mouth 2 (Two) Times a Day.  Dispense: 10 tablet; Refill: 0      Going to treat patient with 5 days of Cipro twice daily.  Advised to increase water intake.  Instructed to  Diflucan from pharmacy when she picks up Cipro.  We will culture.  Will notify patient when those results return.            Follow Up       No follow-ups on file.      Patient was given instructions and counseling regarding her condition or for health maintenance advice. Please see specific information pulled into the AVS if appropriate.     Deidre Mehta, APRN5/4/202311:23 EDT  This note has been electronically signed

## 2023-05-06 LAB
BACTERIA UR CULT: NORMAL
BACTERIA UR CULT: NORMAL

## 2023-05-22 ENCOUNTER — OFFICE VISIT (OUTPATIENT)
Dept: FAMILY MEDICINE CLINIC | Facility: CLINIC | Age: 33
End: 2023-05-22
Payer: COMMERCIAL

## 2023-05-22 VITALS
DIASTOLIC BLOOD PRESSURE: 93 MMHG | HEART RATE: 97 BPM | SYSTOLIC BLOOD PRESSURE: 146 MMHG | HEIGHT: 69 IN | OXYGEN SATURATION: 97 % | TEMPERATURE: 96.8 F | BODY MASS INDEX: 25.62 KG/M2 | WEIGHT: 173 LBS

## 2023-05-22 DIAGNOSIS — G62.9 NEUROPATHY: ICD-10-CM

## 2023-05-22 DIAGNOSIS — R03.0 ELEVATED BP WITHOUT DIAGNOSIS OF HYPERTENSION: ICD-10-CM

## 2023-05-22 DIAGNOSIS — E11.40 TYPE 2 DIABETES MELLITUS WITH DIABETIC NEUROPATHY, WITHOUT LONG-TERM CURRENT USE OF INSULIN: ICD-10-CM

## 2023-05-22 DIAGNOSIS — E11.65 TYPE 2 DIABETES MELLITUS WITH HYPERGLYCEMIA, WITHOUT LONG-TERM CURRENT USE OF INSULIN: Primary | ICD-10-CM

## 2023-05-22 PROCEDURE — 99213 OFFICE O/P EST LOW 20 MIN: CPT | Performed by: NURSE PRACTITIONER

## 2023-05-22 RX ORDER — TIRZEPATIDE 5 MG/.5ML
5 INJECTION, SOLUTION SUBCUTANEOUS WEEKLY
Qty: 3 ML | Refills: 0 | Status: SHIPPED | OUTPATIENT
Start: 2023-05-22

## 2023-05-22 RX ORDER — METFORMIN HYDROCHLORIDE 500 MG/1
1000 TABLET, EXTENDED RELEASE ORAL 2 TIMES DAILY
Qty: 90 TABLET | Refills: 3 | Status: SHIPPED | OUTPATIENT
Start: 2023-05-22

## 2023-05-22 RX ORDER — GABAPENTIN 100 MG/1
100 CAPSULE ORAL NIGHTLY
Qty: 90 CAPSULE | Refills: 3 | Status: SHIPPED | OUTPATIENT
Start: 2023-05-22

## 2023-05-22 NOTE — PROGRESS NOTES
"Chief Complaint  Med Refill    Subjective          Liz Harper presents to [facility] MEDICINE      History of Present Illness    Mary ramirez 33-year-old female patient who presents today to follow-up on diabetes.    Last A1c was 9.0 4/21/2023.  Prior to that it was 8.8 in January.  She is currently taking metformin 1000 mg twice daily, has a freestyle jerald 2 sensor.  She is in need of refills of both of these today.    We did get her approved for the Mounjaro back in April. We started her on 2.5 mg weekly. She will inject 4th treatment this week. Tolerating well but does admit to some mild side effects the day after of abdominal cramps generalized. Occur off and on after a meal.  Not consistent, cramps and isolated in 1 area.    BP slightly elevated at 146/93.  But has not gone to sleep as she works night shift. Will have monitor at home and follow up at next visit.  If remains elevated at next visit we will likely start antihypertensive      Objective     Vital Signs:   /93 (BP Location: Left arm, Patient Position: Sitting, Cuff Size: Adult)   Pulse 97   Temp 96.8 °F (36 °C) (Infrared)   Ht 175.3 cm (69.02\")   Wt 78.5 kg (173 lb)   SpO2 97%   BMI 25.54 kg/m²           Physical Exam  Vitals reviewed.   Constitutional:       Appearance: She is well-developed.      Comments: Wearing a face mask     HENT:      Head: Normocephalic and atraumatic.      Nose: Nose normal.      Mouth/Throat:      Mouth: Mucous membranes are moist.      Pharynx: Oropharynx is clear.   Eyes:      Conjunctiva/sclera: Conjunctivae normal.      Pupils: Pupils are equal, round, and reactive to light.   Cardiovascular:      Rate and Rhythm: Normal rate and regular rhythm.      Pulses: Normal pulses.      Heart sounds: Normal heart sounds.   Pulmonary:      Effort: Pulmonary effort is normal. No respiratory distress.      Breath sounds: Normal breath sounds.   Abdominal:      General: Bowel " sounds are normal. There is no distension.      Palpations: Abdomen is soft. There is no mass.      Tenderness: There is no abdominal tenderness. There is no guarding or rebound.      Hernia: No hernia is present.   Musculoskeletal:         General: Normal range of motion.      Cervical back: Normal range of motion.   Skin:     General: Skin is warm and dry.      Findings: No rash.   Neurological:      Mental Status: She is alert and oriented to person, place, and time.   Psychiatric:         Behavior: Behavior normal.                Result Review :                                   Assessment and Plan      Diagnoses and all orders for this visit:    1. Type 2 diabetes mellitus with hyperglycemia, without long-term current use of insulin (Primary)  -     metFORMIN ER (GLUCOPHAGE-XR) 500 MG 24 hr tablet; Take 2 tablets by mouth 2 (Two) Times a Day.  Dispense: 90 tablet; Refill: 3  -     Ambulatory Referral to Endocrinology  -     Tirzepatide (Mounjaro) 5 MG/0.5ML solution pen-injector; Inject 0.5 mL under the skin into the appropriate area as directed 1 (One) Time Per Week.  Dispense: 3 mL; Refill: 0  -     Continuous Blood Gluc Sensor (FreeStyle Jim 2 Sensor) misc; Apply 1 each topically to the appropriate area as directed See Admin Instructions.  Dispense: 2 each; Refill: 3  -     gabapentin (NEURONTIN) 100 MG capsule; Take 1 capsule by mouth Every Night.  Dispense: 90 capsule; Refill: 3    2. Type 2 diabetes mellitus with diabetic neuropathy, without long-term current use of insulin  -     gabapentin (NEURONTIN) 100 MG capsule; Take 1 capsule by mouth Every Night.  Dispense: 90 capsule; Refill: 3    3. Neuropathy    4. Elevated BP without diagnosis of hypertension      [facility] [place]  She reports that Dr. Andrade's office cannot get her in until December 2023 or January 2024.  Patient in need of refill of gabapentin for nighttime neuropathy symptoms.  We will  bump her up on the Mounjaro from 2.5 mg weekly to 5 mg weekly.  Advised to reach out if she has any unwanted side effects or symptoms.  Patient to follow back up in 6 weeks.              Follow Up       Return in about 5 weeks (around 6/26/2023) for with JOSE DE JESUS Lenz.      Patient was given instructions and counseling regarding her condition or for health maintenance advice. Please see specific information pulled into the AVS if appropriate.     Deidre Mehta, APRN5/22/202308:54 EDT  This note has been electronically signed

## 2023-06-05 ENCOUNTER — TELEPHONE (OUTPATIENT)
Dept: FAMILY MEDICINE CLINIC | Facility: CLINIC | Age: 33
End: 2023-06-05
Payer: COMMERCIAL

## 2023-06-05 DIAGNOSIS — E11.40 TYPE 2 DIABETES MELLITUS WITH DIABETIC NEUROPATHY, WITHOUT LONG-TERM CURRENT USE OF INSULIN: ICD-10-CM

## 2023-06-05 DIAGNOSIS — E11.65 TYPE 2 DIABETES MELLITUS WITH HYPERGLYCEMIA, WITHOUT LONG-TERM CURRENT USE OF INSULIN: Primary | ICD-10-CM

## 2023-06-05 NOTE — TELEPHONE ENCOUNTER
Attempting to contact pt, [place] answer[place] [facility]    HUB TO READ:  The prescription savings card is a one-time use.  Patient will likely need to meet her deductible before insurance will cover. Does she know what her insurance deductible is for she is close to meeting it? We may need to either consider oral agents or get patient to endocrinology - I referred her back in May 22, 2023. I have placed a new order for [facility] as of today.

## 2023-06-05 NOTE — TELEPHONE ENCOUNTER
The prescription savings card is a one-time use.  Patient will likely need to meet her deductible before insurance will cover. Does she know what her insurance deductible is for she is close to meeting it? We may need to either consider oral agents or get patient to endocrinology - I referred her back in May 22, 2023. I have placed a new order for [facility] as of today.

## 2023-06-05 NOTE — TELEPHONE ENCOUNTER
Caller: Liz Harper    Relationship to patient: Self    Best call back number: [phone]    Patient is needing: PATIENT STATES THAT WHEN SHE WENT TO  HER     Tirzepatide (Mounjaro) 5 MG/0.5ML solution pen-injector     THAT HER INSURANCE DOESN'T COVER IT AND THAT THE PHARMACY WOULDN'T LET HER USE THE DISCOUNT CARD THAT SHE USED LAST TIME. PLEASE REACH OUT AND ADVISE.

## 2023-06-06 ENCOUNTER — TELEPHONE (OUTPATIENT)
Dept: FAMILY MEDICINE CLINIC | Facility: CLINIC | Age: 33
End: 2023-06-06
Payer: COMMERCIAL

## 2023-06-06 DIAGNOSIS — E11.65 TYPE 2 DIABETES MELLITUS WITH HYPERGLYCEMIA, WITHOUT LONG-TERM CURRENT USE OF INSULIN: ICD-10-CM

## 2023-06-06 DIAGNOSIS — E11.40 TYPE 2 DIABETES MELLITUS WITH DIABETIC NEUROPATHY, WITHOUT LONG-TERM CURRENT USE OF INSULIN: Primary | ICD-10-CM

## 2023-06-06 NOTE — TELEPHONE ENCOUNTER
I READ THE HUB TO READ TO PATIENT.  SHE WILL CONTACT HER INSURANCE COMPANY TO FIND ANOTHER ENDOCRINOLOGIST.

## 2023-06-06 NOTE — TELEPHONE ENCOUNTER
Patient called back stating that insurance will not cover Mounjaro due to her not being on insulin.  She needs to be on something that is oral.

## 2023-06-06 NOTE — TELEPHONE ENCOUNTER
Okay.  We will go ahead and place patient on oral agents for now.  She has an appointment to see endocrinology however that is not until January 2024.  Can you have her call her insurance company and see who else is in network?  She needs to call me with a provider so I can try to get her in sooner.

## 2023-06-06 NOTE — TELEPHONE ENCOUNTER
ATTEMPTING TO CONTACT PT BACK, NO ANSWER[place] [facility]    HUB TO READ:  Okay.  We will go ahead and place patient on oral agents for now.  She has an appointment to see endocrinology however that is not until January 2024.  Can you have her call her insurance company and see who else is in network?  She needs to call me with a provider so I can try to get her in sooner.   advise her that I am placing her on Januvia 50 mg once daily.  This will go well alongside the metformin 1000 mg twice daily.

## 2023-06-19 ENCOUNTER — TELEPHONE (OUTPATIENT)
Dept: FAMILY MEDICINE CLINIC | Facility: CLINIC | Age: 33
End: 2023-06-19
Payer: COMMERCIAL

## 2023-06-19 NOTE — TELEPHONE ENCOUNTER
Caller: Liz Harper    Relationship: Self    Best call back number: [phone]    What is the medical concern/diagnosis:     What specialty or service is being requested: ENDOCRINOLOGY    What is the provider, practice or medical service name:     [facility]    What is the office location: [facility]    What is the office phone number: [phone]    Any additional details:     WOULD LIKE A NEW REFERRAL TO BE SENT HERE DUE TO NOT BEING ABLE TO GET IN ANYWHERE ELSE SOON.     PLEASE CALL TO CONFIRM.

## 2023-06-19 NOTE — TELEPHONE ENCOUNTER
Spoke with [facility] due to Januvia does not require prior auth, and it was because they was still showing the Monjaro on file as well, so once discontinued then the Januvia should go through with no problem, contacted pt and she is aware

## 2023-06-19 NOTE — TELEPHONE ENCOUNTER
Caller: Liz Harper    Relationship: Self    Best call back number: [phone]    What is the best time to reach you: ANYTIME    Who are you requesting to speak with (clinical staff, provider,  specific staff member): CLINICAL    What was the call regarding: PATIENT CALLING STATING THAT THE SITagliptin (Januvia) 50 MG tablet WILL NEED A PRIOR AUTHORIZATION. THE PATIENT STATES THIS IS A NEW PRESCRIPTION.    Is it okay if the provider responds through [facility]: RESPOND VIA TELEPHONE PLEASE

## 2023-06-19 NOTE — TELEPHONE ENCOUNTER
Generic Januvia has been sent to pharmacy.  Pharmacy is stating that they did not receive.  Can it be resent?

## 2023-06-20 NOTE — TELEPHONE ENCOUNTER
Please notify patient that I am placing her on Precose 50 mg 3 times daily.  She is to take this with meals.  This should help keep the insulin levels from rising with food intake.  Continue to adhere to low-carb diet, high-protein intake.  I will see patient next week.

## 2023-06-20 NOTE — TELEPHONE ENCOUNTER
PT CONTACTED BACK AND STATED THE MOUNJARO WAS GOING TO BE OVER $500 AND THE JANUVIA IS GOING TO BE $391 AND THAT IS AFTER INSURANCE AND AFTER COUPON AND SHE IS NOT ABLE TO AFFORD THAT, STATES TO HAVE APPT WITH ENDOCRINOLOGY BUT NOT TILL END OF AUGUST ANY SUGGESTIONS?

## 2023-06-20 NOTE — TELEPHONE ENCOUNTER
SPOKE WITH SANDRA AND THE JANUVIA IS GOING TO BE $391 AFTER INSURANCE AND COUPON, TRIED TO CONTACT PT TO SEE WHAT MEDICATION SHE IS WANTING TO FILL, NO ANSWER, LMOM

## 2023-06-29 ENCOUNTER — TELEPHONE (OUTPATIENT)
Dept: FAMILY MEDICINE CLINIC | Facility: CLINIC | Age: 33
End: 2023-06-29

## 2023-06-29 NOTE — TELEPHONE ENCOUNTER
Caller: Liz Harper    Relationship: Self    Best call back number: [phone]    PATIENT IS GOING TO GO TO [facility] FOR WHAT SHE BELIEVES IS PANCREATITIS, TO GET EVALUATED.    SHE WILL CONTACT THE OFFICE AFTERWARD FOR A FOLLOW UP WITH YADIEL THOMAS.

## 2023-09-26 DIAGNOSIS — K29.50 CHRONIC GASTRITIS WITHOUT BLEEDING, UNSPECIFIED GASTRITIS TYPE: Primary | ICD-10-CM

## 2023-09-28 RX ORDER — SERTRALINE HYDROCHLORIDE 100 MG/1
TABLET, FILM COATED ORAL
Qty: 90 TABLET | Refills: 3 | Status: SHIPPED | OUTPATIENT
Start: 2023-09-28

## 2023-09-29 ENCOUNTER — TELEPHONE (OUTPATIENT)
Dept: FAMILY MEDICINE CLINIC | Facility: CLINIC | Age: 33
End: 2023-09-29

## 2023-09-29 ENCOUNTER — OFFICE VISIT (OUTPATIENT)
Dept: FAMILY MEDICINE CLINIC | Facility: CLINIC | Age: 33
End: 2023-09-29
Payer: COMMERCIAL

## 2023-09-29 VITALS
HEIGHT: 69 IN | BODY MASS INDEX: 39.69 KG/M2 | DIASTOLIC BLOOD PRESSURE: 97 MMHG | WEIGHT: 268 LBS | OXYGEN SATURATION: 98 % | SYSTOLIC BLOOD PRESSURE: 147 MMHG | HEART RATE: 91 BPM | TEMPERATURE: 97.2 F

## 2023-09-29 DIAGNOSIS — N89.8 VAGINAL ITCHING: ICD-10-CM

## 2023-09-29 DIAGNOSIS — N89.8 VAGINAL IRRITATION: ICD-10-CM

## 2023-09-29 DIAGNOSIS — R31.9 HEMATURIA, UNSPECIFIED TYPE: ICD-10-CM

## 2023-09-29 DIAGNOSIS — R80.9 PROTEINURIA, UNSPECIFIED TYPE: ICD-10-CM

## 2023-09-29 DIAGNOSIS — M54.50 LOW BACK PAIN WITHOUT SCIATICA, UNSPECIFIED BACK PAIN LATERALITY, UNSPECIFIED CHRONICITY: Primary | ICD-10-CM

## 2023-09-29 LAB
BILIRUB BLD-MCNC: NEGATIVE MG/DL
CLARITY, POC: CLEAR
COLOR UR: YELLOW
EXPIRATION DATE: ABNORMAL
GLUCOSE UR STRIP-MCNC: ABNORMAL MG/DL
KETONES UR QL: NEGATIVE
LEUKOCYTE EST, POC: NEGATIVE
Lab: ABNORMAL
NITRITE UR-MCNC: NEGATIVE MG/ML
PH UR: 5.5 [PH]
PROT UR STRIP-MCNC: ABNORMAL MG/DL
RBC # UR STRIP: ABNORMAL /UL
SP GR UR: 1.02 (ref 1–1.03)
UROBILINOGEN UR QL: NORMAL

## 2023-09-29 PROCEDURE — 99213 OFFICE O/P EST LOW 20 MIN: CPT | Performed by: NURSE PRACTITIONER

## 2023-09-29 PROCEDURE — 81003 URINALYSIS AUTO W/O SCOPE: CPT | Performed by: NURSE PRACTITIONER

## 2023-09-29 RX ORDER — INSULIN ASPART 100 [IU]/ML
INJECTION, SOLUTION INTRAVENOUS; SUBCUTANEOUS
COMMUNITY
Start: 2023-08-30

## 2023-09-29 RX ORDER — FLUCONAZOLE 150 MG/1
150 TABLET ORAL 2 TIMES DAILY
Qty: 3 TABLET | Refills: 0 | Status: SHIPPED | OUTPATIENT
Start: 2023-09-29 | End: 2023-09-29 | Stop reason: SDUPTHER

## 2023-09-29 RX ORDER — FLUCONAZOLE 150 MG/1
150 TABLET ORAL 2 TIMES DAILY
Qty: 6 TABLET | Refills: 0 | Status: SHIPPED | OUTPATIENT
Start: 2023-09-29

## 2023-09-29 RX ORDER — INSULIN GLARGINE 100 [IU]/ML
INJECTION, SOLUTION SUBCUTANEOUS
COMMUNITY
Start: 2023-08-29

## 2023-09-29 RX ORDER — METFORMIN HYDROCHLORIDE 500 MG/1
2 TABLET, EXTENDED RELEASE ORAL EVERY 12 HOURS SCHEDULED
COMMUNITY
Start: 2023-09-27

## 2023-09-29 NOTE — TELEPHONE ENCOUNTER
Pharmacy Name:  [facility]    Reference Number (if applicable):     Pharmacy representative name: KENDRA    Pharmacy representative phone number:[phone]     What medication are you calling in regards to: fluconazole (Diflucan) 150 MG tablet     What question does the pharmacy have: DIRECTIONS SAY 1 3X A DAY PLEASE CLARIFY IF CORRECT.     Who is the provider that prescribed the medication: WILLIAM

## 2023-09-29 NOTE — PROGRESS NOTES
"Chief Complaint  Back Pain    Liz Harper presents to [facility]    Subjective      33-year-old female patient who presents today with backache and vaginal symptoms    Symptoms began yesterday with external vaginal itching and irritation. Sexually active and monogamous relationship. No discharge or odor.  No concern for STD. LMP 9/17/23.  Denies dysuria, fever, chills.          Objective     Physical Exam  Vitals reviewed.   Constitutional:       Appearance: She is well-developed.      Comments:      HENT:      Head: Normocephalic and atraumatic.      Nose: Nose normal.      Mouth/Throat:      Mouth: Mucous membranes are moist.      Pharynx: Oropharynx is clear.   Eyes:      Conjunctiva/sclera: Conjunctivae normal.      Pupils: Pupils are equal, round, and reactive to light.   Cardiovascular:      Rate and Rhythm: Normal rate.   Pulmonary:      Effort: Pulmonary effort is normal. No respiratory distress.   Abdominal:      General: Abdomen is protuberant. Bowel sounds are normal.      Palpations: Abdomen is soft.      Tenderness: There is abdominal tenderness in the suprapubic area. There is no right CVA tenderness, left CVA tenderness, guarding or rebound.   Musculoskeletal:         General: Normal range of motion.      Cervical back: Normal range of motion.   Skin:     General: Skin is warm and dry.      Findings: No rash.   Neurological:      Mental Status: She is alert and oriented to person, place, and time.   Psychiatric:         Behavior: Behavior normal.       Vital Signs:     /97 (BP Location: Right arm, Patient Position: Sitting, Cuff Size: Adult)   Pulse 91   Temp 97.2 °F (36.2 °C) (Infrared)   Ht 175.3 cm (69.02\")   Wt 122 kg (268 lb)   SpO2 98%   BMI 39.56 kg/m²         History of Present Illness      Patient Active Problem List   Diagnosis    Hair loss    Fatigue    Easy bruising    Thyroid nodule    Acute intractable headache    PCOS (polycystic " ovarian syndrome)    Type 2 diabetes mellitus with hyperglycemia, without long-term current use of insulin    Positive self-administered antigen test for COVID-19    Elevated triglycerides with high cholesterol    Abdominal pain    Acute cystitis with hematuria    Vaginal yeast infection    Neuropathy    Type 2 diabetes mellitus with diabetic neuropathy, without long-term current use of insulin    Elevated BP without diagnosis of hypertension    Chronic gastritis without bleeding    Vaginal irritation    Low back pain without sciatica    Vaginal itching         Past Medical History:   Diagnosis Date    Depression     Diabetes mellitus     Menstrual problem           Family History   Problem Relation Age of Onset    No Known Problems Mother     No Known Problems Father     No Known Problems Brother     Kidney disease Paternal Aunt           Past Surgical History:   Procedure Laterality Date    APPENDECTOMY      FEMUR FRACTURE SURGERY      WISDOM TOOTH EXTRACTION            Social History     Socioeconomic History    Marital status:    Tobacco Use    Smoking status: Never    Smokeless tobacco: Never   Vaping Use    Vaping Use: Never used   Substance and Sexual Activity    Alcohol use: Never    Drug use: Defer    Sexual activity: Defer            Physical Exam  Vitals reviewed.   Constitutional:       Appearance: She is well-developed.      Comments:      HENT:      Head: Normocephalic and atraumatic.      Nose: Nose normal.      Mouth/Throat:      Mouth: Mucous membranes are moist.      Pharynx: Oropharynx is clear.   Eyes:      Conjunctiva/sclera: Conjunctivae normal.      Pupils: Pupils are equal, round, and reactive to light.   Cardiovascular:      Rate and Rhythm: Normal rate.   Pulmonary:      Effort: Pulmonary effort is normal. No respiratory distress.   Abdominal:      General: Abdomen is protuberant. Bowel sounds are normal.      Palpations: Abdomen is soft.      Tenderness: There is abdominal  tenderness in the suprapubic area. There is no right CVA tenderness, left CVA tenderness, guarding or rebound.   Musculoskeletal:         General: Normal range of motion.      Cervical back: Normal range of motion.   Skin:     General: Skin is warm and dry.      Findings: No rash.   Neurological:      Mental Status: She is alert and oriented to person, place, and time.   Psychiatric:         Behavior: Behavior normal.              Result Review :                                   Assessment and Plan      Diagnoses and all orders for this visit:    1. Low back pain without sciatica, unspecified back pain laterality, unspecified chronicity (Primary)  -     POC Urinalysis Dipstick, Automated    2. Vaginal itching  -     NuSwab VG+ - Swab, Vagina  -     fluconazole (Diflucan) 150 MG tablet; Take 1 tablet by mouth 2 (Two) Times a Day.  Dispense: 3 tablet; Refill: 0    3. Vaginal irritation  -     NuSwab VG+ - Swab, Vagina  -     fluconazole (Diflucan) 150 MG tablet; Take 1 tablet by mouth 2 (Two) Times a Day.  Dispense: 3 tablet; Refill: 0    4. Proteinuria, unspecified type  -     Urine Culture - Urine, Urine, Clean Catch; Future  -     Urine Culture - Urine, Urine, Clean Catch    5. Hematuria, unspecified type  -     Urine Culture - Urine, Urine, Clean Catch; Future  -     Urine Culture - Urine, Urine, Clean Catch        Patient states she did a telehealth visit yesterday and they thought she had BV but recommended her to see her PCP.  Advised patient we will not treat for BV based on her not having any symptoms of discharge or odor.  We will obtain swab today and send to lab and I will call her when it returns Monday.    Send like her symptoms are more likely vaginitis yeast.  She is diabetic, has some external vaginal itching and irritation no dysuria.  UA came back with glucose, positive protein, trace hematuria.  We will go ahead and culture just to see if anything grows.  We will place her on a regimen on  high-dose Diflucan 150 mg twice daily for 3 days.  She does report that the last time the Diflucan she was not with any improvement.  She is with low backache and some pelvic pressure and tenderness on palpation        Follow Up       No follow-ups on file.      Patient was given instructions and counseling regarding her condition or for health maintenance advice. Please see specific information pulled into the AVS if appropriate.     Deidre Mehta, APRN9/29/202314:03 EDT  This note has been electronically signed

## 2023-10-01 LAB
BACTERIA UR CULT: NORMAL
BACTERIA UR CULT: NORMAL

## 2023-10-02 NOTE — PROGRESS NOTES
Urine culture returned normal urogenital nain.  No UTI present.  We will await the vaginal swab results

## 2023-10-03 DIAGNOSIS — B37.31 VAGINAL YEAST INFECTION: Primary | ICD-10-CM

## 2023-10-03 LAB
A VAGINAE DNA VAG QL NAA+PROBE: ABNORMAL SCORE
BVAB2 DNA VAG QL NAA+PROBE: ABNORMAL SCORE
C ALBICANS DNA VAG QL NAA+PROBE: POSITIVE
C GLABRATA DNA VAG QL NAA+PROBE: NEGATIVE
C TRACH DNA VAG QL NAA+PROBE: NEGATIVE
MEGA1 DNA VAG QL NAA+PROBE: ABNORMAL SCORE
N GONORRHOEA DNA VAG QL NAA+PROBE: NEGATIVE
T VAGINALIS DNA VAG QL NAA+PROBE: NEGATIVE

## 2024-01-11 ENCOUNTER — OFFICE VISIT (OUTPATIENT)
Dept: FAMILY MEDICINE CLINIC | Facility: CLINIC | Age: 34
End: 2024-01-11
Payer: COMMERCIAL

## 2024-01-11 VITALS
WEIGHT: 280 LBS | HEART RATE: 90 BPM | TEMPERATURE: 97.7 F | SYSTOLIC BLOOD PRESSURE: 157 MMHG | DIASTOLIC BLOOD PRESSURE: 94 MMHG | OXYGEN SATURATION: 98 % | BODY MASS INDEX: 41.47 KG/M2 | HEIGHT: 69 IN

## 2024-01-11 DIAGNOSIS — J32.9 SINUSITIS, UNSPECIFIED CHRONICITY, UNSPECIFIED LOCATION: Primary | ICD-10-CM

## 2024-01-11 PROCEDURE — 99213 OFFICE O/P EST LOW 20 MIN: CPT | Performed by: NURSE PRACTITIONER

## 2024-01-11 RX ORDER — METHYLPREDNISOLONE 4 MG/1
TABLET ORAL
Qty: 21 EACH | Refills: 0 | Status: SHIPPED | OUTPATIENT
Start: 2024-01-11 | End: 2024-01-16

## 2024-01-11 RX ORDER — DOXYCYCLINE HYCLATE 100 MG/1
200 CAPSULE ORAL DAILY
Qty: 14 CAPSULE | Refills: 0 | Status: SHIPPED | OUTPATIENT
Start: 2024-01-11

## 2024-01-11 RX ORDER — IBUPROFEN 800 MG/1
800 TABLET ORAL EVERY 8 HOURS PRN
Qty: 30 TABLET | Refills: 0 | Status: SHIPPED | OUTPATIENT
Start: 2024-01-11

## 2024-01-11 NOTE — PROGRESS NOTES
"Chief Complaint  Sore Throat        Liz Harper presents to [facility]        Subjective      33-year-old female patient presents today with complaints of sore throat sinus pressure.    For past four days bilateral neck/chin/cheeks tender. She has been with inflammation, difficulty moving head from side to side. No pain with swallowing, no difficulty chewing or eating. Face has been flushed upon waking. As day goes on it improves. She has been taking ibuprofen 600 mg one to two times daily, the  facial swelling and pain will improve. She is with tender teeth on top and bottom and painful sinuses. She is without cough, fever, chills, SOB. Rhinorrhea clear and with some sneezing. Body is sore. No other contacts sick at home.  Has not improved or worsened since starting.     Glucose levels have been high. She has appt end of this month to follow up with endocrinology.                         Objective         Vital Signs:     /94 (BP Location: Left arm, Patient Position: Sitting, Cuff Size: Adult)   Pulse 90   Temp 97.7 °F (36.5 °C) (Infrared)   Ht 175.3 cm (69.02\")   Wt 127 kg (280 lb)   SpO2 98%   BMI 41.33 kg/m²       Physical Exam  Vitals reviewed.   Constitutional:       Appearance: She is well-developed.      Comments:      HENT:      Head: Normocephalic and atraumatic.      Right Ear: Tympanic membrane, ear canal and external ear normal.      Left Ear: Tympanic membrane, ear canal and external ear normal.      Nose: Nasal tenderness and congestion present.      Right Turbinates: Enlarged.      Left Turbinates: Enlarged.      Right Sinus: Maxillary sinus tenderness and frontal sinus tenderness present.      Left Sinus: Maxillary sinus tenderness and frontal sinus tenderness present.      Mouth/Throat:      Lips: Pink. No lesions.      Mouth: Mucous membranes are moist. No injury or oral lesions.      Tongue: Lesions present.      Palate: No mass and " lesions.      Pharynx: Oropharynx is clear. Uvula midline. No pharyngeal swelling, oropharyngeal exudate, posterior oropharyngeal erythema or uvula swelling.      Tonsils: No tonsillar exudate or tonsillar abscesses.      Comments: Geographic tongue noted  Eyes:      Conjunctiva/sclera: Conjunctivae normal.      Pupils: Pupils are equal, round, and reactive to light.   Cardiovascular:      Rate and Rhythm: Normal rate and regular rhythm.      Pulses: Normal pulses.      Heart sounds: Normal heart sounds.   Pulmonary:      Effort: Pulmonary effort is normal. No respiratory distress.      Breath sounds: Normal breath sounds. No stridor. No wheezing, rhonchi or rales.   Chest:      Chest wall: No tenderness.   Musculoskeletal:         General: Normal range of motion.      Cervical back: Normal range of motion.   Lymphadenopathy:      Head:      Right side of head: No submental, submandibular, tonsillar, preauricular, posterior auricular or occipital adenopathy.      Left side of head: No submental, submandibular, tonsillar, preauricular, posterior auricular or occipital adenopathy.      Cervical: No cervical adenopathy.      Right cervical: No superficial, deep or posterior cervical adenopathy.     Left cervical: No superficial, deep or posterior cervical adenopathy.      Comments: No lymphadenopathy but submandibular submental tonsillar discomfort to palpation   Skin:     General: Skin is warm and dry.      Findings: No rash.   Neurological:      Mental Status: She is alert and oriented to person, place, and time.   Psychiatric:         Behavior: Behavior normal.                History of Present Illness      Patient Active Problem List   Diagnosis    Hair loss    Fatigue    Easy bruising    Thyroid nodule    Acute intractable headache    PCOS (polycystic ovarian syndrome)    Type 2 diabetes mellitus with hyperglycemia, without long-term current use of insulin    Positive self-administered antigen test for COVID-19     Elevated triglycerides with high cholesterol    Abdominal pain    Acute cystitis with hematuria    Vaginal yeast infection    Neuropathy    Type 2 diabetes mellitus with diabetic neuropathy, without long-term current use of insulin    Elevated BP without diagnosis of hypertension    Chronic gastritis without bleeding    Vaginal irritation    Low back pain without sciatica    Vaginal itching    Sinusitis         Past Medical History:   Diagnosis Date    Depression     Diabetes mellitus     Menstrual problem           Family History   Problem Relation Age of Onset    No Known Problems Mother     No Known Problems Father     No Known Problems Brother     Kidney disease Paternal Aunt           Past Surgical History:   Procedure Laterality Date    APPENDECTOMY      FEMUR FRACTURE SURGERY      WISDOM TOOTH EXTRACTION            Social History     Socioeconomic History    Marital status:    Tobacco Use    Smoking status: Never    Smokeless tobacco: Never   Vaping Use    Vaping Use: Never used   Substance and Sexual Activity    Alcohol use: Never    Drug use: Defer    Sexual activity: Defer                    Result Review :                                         Assessment and Plan      Diagnoses and all orders for this visit:    1. Sinusitis, unspecified chronicity, unspecified location (Primary)  -     methylPREDNISolone (MEDROL) 4 MG dose pack; Take as directed on package instructions.  Dispense: 21 each; Refill: 0  -     ibuprofen (ADVIL,MOTRIN) 800 MG tablet; Take 1 tablet by mouth Every 8 (Eight) Hours As Needed for Mild Pain or Headache.  Dispense: 30 tablet; Refill: 0  -     doxycycline (VIBRAMYCIN) 100 MG capsule; Take 2 capsules by mouth Daily.  Dispense: 14 capsule; Refill: 0      Patient flank face is mildly flushed.  She has tender frontal and maxillary sinuses bilaterally nasal congestion.  She does have some inflammation of the cheek and chin area bilaterally no shortness of breath difficulty  swallowing.  Will go ahead and treat for sinusitis with doxycycline and Medrol pack.  Recommend if any changes in symptoms to go to emergency room for further evaluation.                      Follow Up       No follow-ups on file.      Patient was given instructions and counseling regarding her condition or for health maintenance advice. Please see specific information pulled into the AVS if appropriate.     Deidre Mehta, APRN1/11/202415:41 EST  This note has been electronically signed

## 2024-02-20 ENCOUNTER — TELEPHONE (OUTPATIENT)
Dept: FAMILY MEDICINE CLINIC | Facility: CLINIC | Age: 34
End: 2024-02-20
Payer: COMMERCIAL

## 2024-02-20 NOTE — TELEPHONE ENCOUNTER
Caller: Liz Harper    Relationship: Self    Best call back number:     [phone]       What was the call regarding:       PATIENT WANTED TO LET KELLEE KNOW SHE WILL BE IN [facility] IN THE MORNING TO GET HER BLOOD WORK DONE.    ANY QUESTIONS OR CONCERNS PLEASE CALL PATIENT

## 2024-06-03 ENCOUNTER — E-VISIT (OUTPATIENT)
Dept: ADMINISTRATIVE | Facility: OTHER | Age: 34
End: 2024-06-03
Payer: COMMERCIAL

## 2024-06-03 DIAGNOSIS — J32.9 SINUSITIS, UNSPECIFIED CHRONICITY, UNSPECIFIED LOCATION: ICD-10-CM

## 2024-06-03 RX ORDER — DOXYCYCLINE HYCLATE 100 MG/1
200 CAPSULE ORAL DAILY
Qty: 14 CAPSULE | Refills: 0 | OUTPATIENT
Start: 2024-06-03

## 2024-06-03 NOTE — E-VISIT ESCALATED
Chief Complaint: Bladder infection (UTI)   Patient was shown the following escalation message:   Some conditions need a visit with a healthcare provider   Vaginal bleeding or spotting might mean you have a more complicated condition. You should speak with a provider to get care.   ----------   Patient Interview Transcript:   Knowing about your anatomy is important for diagnosing and treating UTIs. The gender we have on file for you is female, but we realize this might not tell the whole story. Which of these do you have? Select one.    Vagina   Not selected:    Penis   Which of these symptoms do you have? Select all that apply.    Pain or burning while urinating    Frequent urination    __Sudden urge to urinate and it's hard to hold the urine in __   How long have you had these symptoms? Select one.    1 to 3 days   Not selected:    Less than 24 hours    4 to 6 days    7 to 10 days    More than 10 days   Have you already started taking antibiotics for your current symptoms? Select one.    No   Not selected:    Yes   Since your current symptoms started, has it been difficult to start, stop, or delay urination? Select one.    Yes   Not selected:    No   What color is your urine? Select one.    Cloudy   Not selected:    Clear    Yellow    Pink or red   Does your urine smell strange (like ammonia) or stronger than usual? Select one.    Yes   Not selected:    No   Do you also have any of these symptoms? Select all that apply.    No   Not selected:    Fever    Nausea    Vomiting    Pain, pressure, or discomfort in the lower abdomen    Back pain   Do you have any flank pain? The flank is the side of the body between the ribs and the hips.    No   Not selected:    Yes, in my left flank    Yes, in my right flank    Yes, in both my left and right flanks   Do you have any of these vaginal symptoms? Select all that apply.    Unscheduled or abnormal vaginal bleeding or spotting   Not selected:    Abnormal vaginal itching     Pain during sex    Visible sores on the vagina    Abnormal vaginal discharge    No   ----------   Medical history   Medical history data does not currently exist for this patient.

## 2024-07-01 DIAGNOSIS — E11.65 TYPE 2 DIABETES MELLITUS WITH HYPERGLYCEMIA, WITHOUT LONG-TERM CURRENT USE OF INSULIN: ICD-10-CM

## 2024-07-01 DIAGNOSIS — E11.40 TYPE 2 DIABETES MELLITUS WITH DIABETIC NEUROPATHY, WITHOUT LONG-TERM CURRENT USE OF INSULIN: ICD-10-CM

## 2024-07-01 RX ORDER — GABAPENTIN 100 MG/1
100 CAPSULE ORAL NIGHTLY
Qty: 90 CAPSULE | Refills: 0 | Status: SHIPPED | OUTPATIENT
Start: 2024-07-01

## 2024-09-23 ENCOUNTER — PATIENT MESSAGE (OUTPATIENT)
Dept: ENDOCRINOLOGY | Facility: CLINIC | Age: 34
End: 2024-09-23
Payer: COMMERCIAL